# Patient Record
Sex: FEMALE | Race: WHITE | NOT HISPANIC OR LATINO | Employment: FULL TIME | URBAN - METROPOLITAN AREA
[De-identification: names, ages, dates, MRNs, and addresses within clinical notes are randomized per-mention and may not be internally consistent; named-entity substitution may affect disease eponyms.]

---

## 2020-01-21 ENCOUNTER — TELEPHONE (OUTPATIENT)
Dept: OTHER | Facility: OUTPATIENT CENTER | Age: 40
End: 2020-01-21

## 2020-01-21 NOTE — TELEPHONE ENCOUNTER
[facility] Telephone Intake    Date:  2020  Client Name:  Jose Francois  Preferred Name: Candida    MRN:  [id number]   :  1980       Age:  39 year old     Presenting Problem / Reason for Assessment   (Clinical History &Symptoms):     Pt Candida has struggled with gender identity for last 20 years. Summer 2019 made decision to move forward with a transition. Prefers Candida, uses female pronouns. Not out at work. Uses male name and pronouns in public. Wanting to discuss ways to move forward, would like to discuss HRT. Pt is open to seeing therapist, has a contact with w sliding scale. Candida will be a self-pay patient.     Suggested Program: TG    Follow Up:    Insurance Benefits to be evaluated.  Note will be entered when validated.    Patient wishes to be contacted regarding Insurance benefits:  Yes- Please call to verify Self-pay Process.    Please Verify Registration    Please send Welcome Packet and document date sent.

## 2020-01-28 NOTE — TELEPHONE ENCOUNTER
.Per: 0 w/ 0  Copay$ 0   Ded$ 0; Met$ 0   Coins 0%    Out of Pocket Max$ 0 ; Met$ 0     Psych testing require auth ([id number])? not applicable  Extended therapy require auth ([id number])?  not applicable    Exclusions:   Family Therapy (30624/62010)  NOT APPLICABLE    Marriage couple counseling  YES   Transgender/Gender Dysphoria not applicable   CSB not applicable   Sexual dysfunction not applicable    Patient Contacted about benefits:  SPOKE TO PATIENT RE:SELF-PAY - PLEASE MAKE SURE PATIENT/PROVIDER GET FEE TICKETS FOR PATIENT TO PAY FOR SERVICES SAME DAY WITH 16% DISCOUNT.  Date contacted: 1/28/2020

## 2020-01-30 ENCOUNTER — OFFICE VISIT (OUTPATIENT)
Dept: OTHER | Facility: OUTPATIENT CENTER | Age: 40
End: 2020-01-30

## 2020-01-30 DIAGNOSIS — F64.0 GENDER DYSPHORIA IN ADOLESCENT AND ADULT: ICD-10-CM
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
## 2020-02-11 ENCOUNTER — OFFICE VISIT (OUTPATIENT)
Dept: OTHER | Facility: OUTPATIENT CENTER | Age: 40
End: 2020-02-11

## 2020-02-11 VITALS — SYSTOLIC BLOOD PRESSURE: 137 MMHG | WEIGHT: 211.2 LBS | HEART RATE: 67 BPM | DIASTOLIC BLOOD PRESSURE: 98 MMHG

## 2020-02-11 DIAGNOSIS — F64.0 GENDER DYSPHORIA IN ADOLESCENT AND ADULT: Primary | ICD-10-CM

## 2020-02-13 DIAGNOSIS — Z01.89 LABORATORY TEST: Primary | ICD-10-CM

## 2020-02-13 DIAGNOSIS — F64.0 GENDER DYSPHORIA IN ADOLESCENT AND ADULT: ICD-10-CM

## 2020-02-13 LAB
ALBUMIN SERPL-MCNC: 4.5 G/DL (ref 3.4–5)
ALP SERPL-CCNC: 91 U/L (ref 40–150)
ALT SERPL W P-5'-P-CCNC: 33 U/L (ref 0–70)
ANION GAP SERPL CALCULATED.3IONS-SCNC: <1 MMOL/L (ref 3–14)
AST SERPL W P-5'-P-CCNC: 20 U/L (ref 0–45)
BILIRUB SERPL-MCNC: 0.6 MG/DL (ref 0.2–1.3)
BUN SERPL-MCNC: 18 MG/DL (ref 7–30)
CALCIUM SERPL-MCNC: 9.3 MG/DL (ref 8.5–10.1)
CHLORIDE SERPL-SCNC: 106 MMOL/L (ref 94–109)
CHOLEST SERPL-MCNC: 167 MG/DL
CO2 SERPL-SCNC: 33 MMOL/L (ref 20–32)
CREAT SERPL-MCNC: 0.95 MG/DL (ref 0.66–1.25)
GFR SERPL CREATININE-BSD FRML MDRD: >90 ML/MIN/{1.73_M2}
GLUCOSE SERPL-MCNC: 88 MG/DL (ref 70–99)
HDLC SERPL-MCNC: 64 MG/DL
LDLC SERPL CALC-MCNC: 90 MG/DL
NONHDLC SERPL-MCNC: 103 MG/DL
POTASSIUM SERPL-SCNC: 4.2 MMOL/L (ref 3.4–5.3)
PROT SERPL-MCNC: 7.8 G/DL (ref 6.8–8.8)
SODIUM SERPL-SCNC: 139 MMOL/L (ref 133–144)
TRIGL SERPL-MCNC: 66 MG/DL

## 2020-02-13 PROCEDURE — 84270 ASSAY OF SEX HORMONE GLOBUL: CPT | Performed by: FAMILY MEDICINE

## 2020-02-13 PROCEDURE — 80053 COMPREHEN METABOLIC PANEL: CPT | Performed by: FAMILY MEDICINE

## 2020-02-13 PROCEDURE — 84403 ASSAY OF TOTAL TESTOSTERONE: CPT | Performed by: FAMILY MEDICINE

## 2020-02-13 PROCEDURE — 80061 LIPID PANEL: CPT | Performed by: FAMILY MEDICINE

## 2020-02-13 NOTE — PROGRESS NOTES
Performed venipuncture for Dr. Beck.  Specimen get send to .    Sue Burdick CMA,SANTOS  February 13, 2020 8:54 AM

## 2020-02-14 PROBLEM — F64.0 GENDER DYSPHORIA IN ADOLESCENT AND ADULT: Status: ACTIVE | Noted: 2020-02-14

## 2020-02-15 LAB
SHBG SERPL-SCNC: 26 NMOL/L (ref 11–80)
TESTOST FREE SERPL-MCNC: 9.01 NG/DL (ref 4.7–24.4)
TESTOST SERPL-MCNC: 390 NG/DL (ref 240–950)

## 2020-02-24 NOTE — PROGRESS NOTES
This is a transgender medical evaluation.      IDENTIFICATION:  A 39-year-old trans woman.      CHIEF CONCERN:  Hormone therapy.      HISTORY OF PRESENT ILLNESS:  Patient has a longstanding history of gender dysphoria.  The diagnostic assessment was performed on 01/30/2020.  Please see this document for history of gender dysphoria.  The patient's current medical problems include eczema and a 3-day history of a pruritic rash on the back, more painful than pruritic actually.  Back was waxed 2 weeks ago.  Also coincided when started drinking red wine daily.      MEDICATIONS:  Zyrtec.      PAST MEDICAL HISTORY:  No hospitalizations or surgeries.      FAMILY HISTORY:  Unknown due to patient being adopted.      SOCIAL HISTORY:  Patient eats a standard diet but no dairy.  Does take an occasional vitamin D 0662-2893 units daily.      Active 3 times a week with strength training and 3-4 times a week cardio.  He is engaged in an active weight loss plan with a goal weight of 170 and has already lost about 35 pounds.  Quit smoking about 3-4 years ago, was a 1/2 pack per day prior smoker.  Alcohol use:  Once every 2 months, 1-2 drinks per sitting.  No recreational substances.      SEXUAL HISTORY:  The patient is not currently sexually active with a partner.  Has had approximately 7 partners in the lifetime.  Identifies as bisexual.  No history of STIs.  Last tested HIV negative in 04/2019.  Is vaccinated against hepatitis B.      REVIEW OF SYSTEMS:  Notable for the rest described above and bilateral wrist pain.  Remainder of 12-point review of systems is negative.      PHYSICAL EXAMINATION:   GENERAL:  The patient is alert, in no apparent distress.   VITAL SIGNS:  As noted above.  Height 5 feet 11 inches.     EXAM:  Constitutional: healthy, alert and no distress   Cardiovascular: negative, PMI normal. No lifts, heaves, or thrills. RRR. No murmurs, clicks gallops or rub  Respiratory: negative, Percussion normal. Good  diaphragmatic excursion. Lungs clear  Psychiatric: mentation appears normal and affect normal/bright  Neck: Neck supple. No adenopathy. Thyroid symmetric, normal size,, Carotids without bruits.  ENT: ENT exam normal, no neck nodes or sinus tenderness  Abdomen: Abdomen soft, non-tender. BS normal. No masses, organomegaly  : Deferred  NEURO: Gait normal. Reflexes normal and symmetric. Sensation grossly WNL., negative findings: cranial nerves 2-12 intact, muscle strength normal, reflexes normal and symmetric  SKIN: no suspicious lesions; entire back with papularpustular erythematous rash surrounding hair follicles  LYMPH: no cervical adenopathy  JOINT/EXTREMITIES: extremities normal- no gross deformities noted, gait normal and normal muscle tone    A/P  1. Gender dysphoria  The feminizing effects of hormone therapy were discussed at length, along the variability of outcomes and general timeframe for expected feminizing changes. Permanent vs semi-reversible changes were reviewed.   Patient was counseled regarding the potential risks and side effects of feminizing therapy including:  - reduced fertility, reproductive options, need for ongoing contraception (if indicated),  -changes to sexual function including reduced erections, libido and ejaculation  -potential for weight gain, changes to trigylcerides, and other indirect metabolic effects  -increased risk of venous thromboembolic events, gallstones, liver function tests  --mood changes, long term cardiovascular risks, potential cancer risks including breast cancer    I discussed the possible risk of temporary or irreversible impairment of the fertility with the patient today. She demonstrated a complete understanding of the fertility preservation options and declined fertility preservation.    Labs: CMP, lipids, testosterone    2. Folliculitis  Recommend no hair removal/depilation until resolved  Hibiclens x 1, antibacterial soap  Don't scrub or scratch. See PCP if  not resolving or worsening.    Follow-up when ready for hormone start

## 2020-02-25 NOTE — RESULT ENCOUNTER NOTE
Dear Candida REBOLLAR,   Here are your recent results which are within the expected range. Please continue with your current plan of care.       Los Beck MD

## 2020-03-11 ENCOUNTER — HEALTH MAINTENANCE LETTER (OUTPATIENT)
Age: 40
End: 2020-03-11

## 2020-03-19 ASSESSMENT — ANXIETY QUESTIONNAIRES
5. BEING SO RESTLESS THAT IT IS HARD TO SIT STILL: NOT AT ALL
GAD7 TOTAL SCORE: 5
2. NOT BEING ABLE TO STOP OR CONTROL WORRYING: SEVERAL DAYS
7. FEELING AFRAID AS IF SOMETHING AWFUL MIGHT HAPPEN: SEVERAL DAYS
3. WORRYING TOO MUCH ABOUT DIFFERENT THINGS: SEVERAL DAYS
1. FEELING NERVOUS, ANXIOUS, OR ON EDGE: SEVERAL DAYS
6. BECOMING EASILY ANNOYED OR IRRITABLE: NOT AT ALL

## 2020-03-19 ASSESSMENT — PATIENT HEALTH QUESTIONNAIRE - PHQ9
5. POOR APPETITE OR OVEREATING: SEVERAL DAYS
SUM OF ALL RESPONSES TO PHQ QUESTIONS 1-9: 2

## 2020-03-20 ASSESSMENT — ANXIETY QUESTIONNAIRES: GAD7 TOTAL SCORE: 5

## 2020-04-07 ENCOUNTER — MYC MEDICAL ADVICE (OUTPATIENT)
Dept: OTHER | Facility: OUTPATIENT CENTER | Age: 40
End: 2020-04-07

## 2020-04-11 ENCOUNTER — MYC MEDICAL ADVICE (OUTPATIENT)
Dept: OTHER | Facility: OUTPATIENT CENTER | Age: 40
End: 2020-04-11

## 2020-04-14 ENCOUNTER — VIRTUAL VISIT (OUTPATIENT)
Dept: OTHER | Facility: OUTPATIENT CENTER | Age: 40
End: 2020-04-14

## 2020-04-14 DIAGNOSIS — F64.0 GENDER DYSPHORIA IN ADOLESCENT AND ADULT: Primary | ICD-10-CM

## 2020-04-14 RX ORDER — ESTRADIOL 0.1 MG/D
1 FILM, EXTENDED RELEASE TRANSDERMAL
Qty: 8 PATCH | Refills: 2 | Status: SHIPPED | OUTPATIENT
Start: 2020-04-16 | End: 2020-05-12

## 2020-04-14 ASSESSMENT — ENCOUNTER SYMPTOMS
LIGHT-HEADEDNESS: 0
DYSPHORIC MOOD: 0
WEAKNESS: 0
NUMBNESS: 0
CHILLS: 0
FREQUENCY: 0
UNEXPECTED WEIGHT CHANGE: 0
PALPITATIONS: 0
VOMITING: 0
CHEST TIGHTNESS: 0
SHORTNESS OF BREATH: 0
ABDOMINAL PAIN: 0
FEVER: 0
NERVOUS/ANXIOUS: 0
HEADACHES: 0
POLYDIPSIA: 0

## 2020-04-14 NOTE — PROGRESS NOTES
"The following was reviewed with the patient.     \"This telephone visit will be conducted via a call between you and your physician/provider. We have found that certain health care needs can be provided without the need for a physical exam.  This service lets us provide the care you need with a short phone conversation.  If a prescription is necessary we can send it directly to your pharmacy.  If lab work is needed we can place an order for that and you can then stop by our lab to have the test done at a later time.    If during the course of the call the physician/provider feels a telephone visit is not appropriate, you will not be charged for this service.\"     Patient has given verbal consent for Telephone visit?  Yes         DELIA Tirado is a 39 year old individual that uses pronouns She/Her/Hers/Herself that presents today for follow up of:  feminizing hormone therapy.   Alone or accompanied by: accompanied today by self  Gender identity: female  Started Hormone  therapy  4/14/2020  Continues on Other n/a*.   Any special concerns today?    No new concerns or questions, ready to start hormone therapy    On hormones?  No  Gender related body changes since last visit: n/a    Breakthrough bleeding? Does Not Apply    New health concerns since last visit:  none  ---    No past surgical history on file.    Patient Active Problem List   Diagnosis     Gender dysphoria in adolescent and adult       No current outpatient medications on file.       History   Smoking Status     Not on file   Smokeless Tobacco     Not on file        No Known Allergies    Health Maintenance Due   Topic Date Due     MENTAL HEALTH TX PLAN  1980         Problem, Medication and Allergy Lists were reviewed and are current..         Review of Systems:   Review of Systems   Constitutional: Negative for chills, fever and unexpected weight change.   Eyes: Negative for visual disturbance.   Respiratory: Negative for chest tightness and shortness " of breath.    Cardiovascular: Negative for chest pain, palpitations and leg swelling.   Gastrointestinal: Negative for abdominal pain and vomiting.   Endocrine: Negative for polydipsia and polyuria.   Genitourinary: Negative for frequency.   Neurological: Negative for weakness, light-headedness, numbness and headaches.   Psychiatric/Behavioral: Negative for dysphoric mood and suicidal ideas. The patient is not nervous/anxious.               Labs:   Results from last visit:  Orders Only on 02/13/2020   Component Date Value Ref Range Status     Sodium 02/13/2020 139  133 - 144 mmol/L Final     Potassium 02/13/2020 4.2  3.4 - 5.3 mmol/L Final     Chloride 02/13/2020 106  94 - 109 mmol/L Final     Carbon Dioxide 02/13/2020 33* 20 - 32 mmol/L Final     Anion Gap 02/13/2020 <1* 3 - 14 mmol/L Final     Glucose 02/13/2020 88  70 - 99 mg/dL Final     Urea Nitrogen 02/13/2020 18  7 - 30 mg/dL Final     Creatinine 02/13/2020 0.95  0.66 - 1.25 mg/dL Final     GFR Estimate 02/13/2020 >90  >60 mL/min/[1.73_m2] Final    Comment: Non  GFR Calc  Starting 12/18/2018, serum creatinine based estimated GFR (eGFR) will be   calculated using the Chronic Kidney Disease Epidemiology Collaboration   (CKD-EPI) equation.       GFR Estimate If Black 02/13/2020 >90  >60 mL/min/[1.73_m2] Final    Comment:  GFR Calc  Starting 12/18/2018, serum creatinine based estimated GFR (eGFR) will be   calculated using the Chronic Kidney Disease Epidemiology Collaboration   (CKD-EPI) equation.       Calcium 02/13/2020 9.3  8.5 - 10.1 mg/dL Final     Bilirubin Total 02/13/2020 0.6  0.2 - 1.3 mg/dL Final     Albumin 02/13/2020 4.5  3.4 - 5.0 g/dL Final     Protein Total 02/13/2020 7.8  6.8 - 8.8 g/dL Final     Alkaline Phosphatase 02/13/2020 91  40 - 150 U/L Final     ALT 02/13/2020 33  0 - 70 U/L Final     AST 02/13/2020 20  0 - 45 U/L Final     Cholesterol 02/13/2020 167  <200 mg/dL Final     Triglycerides 02/13/2020 66  <150  mg/dL Final     HDL Cholesterol 02/13/2020 64  >39 mg/dL Final     LDL Cholesterol Calculated 02/13/2020 90  <100 mg/dL Final    Desirable:       <100 mg/dl     Non HDL Cholesterol 02/13/2020 103  <130 mg/dL Final     Testosterone Total 02/13/2020 390  240 - 950 ng/dL Final    Comment: This test was developed and its performance characteristics determined by the   [facility].   It has not been cleared or approved by the FDA. The laboratory is regulated   under CLIA as qualified to perform high-complexity testing. This test is used   for clinical purposes. It should not be regarded as investigational or for   research.       Sex Hormone Binding Globulin 02/13/2020 26  11 - 80 nmol/L Final     Free Testosterone Calculated 02/13/2020 9.01  4.7 - 24.4 ng/dL Final       Assessment and Plan   1. Gender dysphoria  Reviewed labs with patient  Discussed protocol for hormone therapy under current pandemic   Start Vivelle dot 0.1 mg 2x/wk,  instructed in use    Anticipate spironolactone start next visit, pt will buy bp monitor to assist with managing monitoring       Follow up:  Follow up in 1 month.    Phone call duration: 22 minutes        Results by Genmabgreyt  Questions were elicited and answered.     Los Beck MD

## 2020-04-21 ENCOUNTER — MYC MEDICAL ADVICE (OUTPATIENT)
Dept: OTHER | Facility: OUTPATIENT CENTER | Age: 40
End: 2020-04-21

## 2020-05-11 ENCOUNTER — TRANSFERRED RECORDS (OUTPATIENT)
Dept: HEALTH INFORMATION MANAGEMENT | Facility: CLINIC | Age: 40
End: 2020-05-11

## 2020-05-11 ENCOUNTER — MYC MEDICAL ADVICE (OUTPATIENT)
Dept: OTHER | Facility: OUTPATIENT CENTER | Age: 40
End: 2020-05-11

## 2020-05-11 RX ORDER — CETIRIZINE HYDROCHLORIDE 10 MG/1
10 TABLET ORAL DAILY
COMMUNITY

## 2020-05-11 RX ORDER — MULTIPLE VITAMINS W/ MINERALS TAB 9MG-400MCG
1 TAB ORAL DAILY
COMMUNITY

## 2020-05-12 ENCOUNTER — VIRTUAL VISIT (OUTPATIENT)
Dept: OTHER | Facility: OUTPATIENT CENTER | Age: 40
End: 2020-05-12

## 2020-05-12 ENCOUNTER — MYC MEDICAL ADVICE (OUTPATIENT)
Dept: OTHER | Facility: OUTPATIENT CENTER | Age: 40
End: 2020-05-12

## 2020-05-12 DIAGNOSIS — F64.0 GENDER DYSPHORIA IN ADOLESCENT AND ADULT: Primary | ICD-10-CM

## 2020-05-12 RX ORDER — SPIRONOLACTONE 100 MG/1
100 TABLET, FILM COATED ORAL DAILY
Qty: 30 TABLET | Refills: 2 | Status: SHIPPED | OUTPATIENT
Start: 2020-05-12 | End: 2020-08-04

## 2020-05-12 RX ORDER — ESTRADIOL 0.1 MG/D
1 FILM, EXTENDED RELEASE TRANSDERMAL
Qty: 8 PATCH | Refills: 2 | Status: SHIPPED | OUTPATIENT
Start: 2020-05-14 | End: 2020-08-04

## 2020-05-12 ASSESSMENT — ENCOUNTER SYMPTOMS
NERVOUS/ANXIOUS: 0
ABDOMINAL PAIN: 0
SHORTNESS OF BREATH: 0
PALPITATIONS: 0
LIGHT-HEADEDNESS: 0
CHILLS: 0
FREQUENCY: 0
POLYDIPSIA: 0
NUMBNESS: 0
CHEST TIGHTNESS: 0
DYSPHORIC MOOD: 0
HEADACHES: 0
WEAKNESS: 0
UNEXPECTED WEIGHT CHANGE: 0
VOMITING: 0
FEVER: 0

## 2020-05-12 NOTE — Clinical Note
1. Please send patient information for setting up lab appt and address for [facility]. Also looking for primary care provider  2. Would like to see TG therapist here,  please help set that up  Pt. Has Nurys

## 2020-05-12 NOTE — PROGRESS NOTES
"The patient has been notified of following:     \"This video visit will be conducted via a call between you and your physician/provider. We have found that certain health care needs can be provided without the need for an in-person physical exam.  This service lets us provide the care you need with a video conversation.  If a prescription is necessary we can send it directly to your pharmacy.  If lab work is needed we can place an order for that and you can then stop by our lab to have the test done at a later time.    If during the course of the call the physician/provider feels a video visit is not appropriate, you will not be charged for this service.\"     Patient has given verbal consent for Video visit? Yes    Patient would like the video invitation sent by: Send to e-mail at: [email or web address]    Video Start Time: 8:00 AM              DELIA Tirado is a 40 year old individual that uses pronouns She/Her/Hers/Herself that presents today for follow up of:  feminizing hormone therapy.   Alone or accompanied by: accompanied today by self  Gender identity: transfeminine  Started Hormone  therapy  *4/14/2020  Continues on Vivelle dot 0.1 mg patch 2x/wk  Any special concerns today?    Feeling a lot more level since starting hormones  Using tegaderm over patch, no concerns with hormone therapy    On hormones?  YES +++ Shot day of the week? Not applicable-taking pills/patch/gel      Due for labs?  Yes      +++ Refills of meds needed?  Yes  Gender related body changes since last visit:   Increase in appetite  Mild breast tenderness    Breakthrough bleeding? Does Not Apply    New health concerns since last visit:  None  ---    No past surgical history on file.    Patient Active Problem List   Diagnosis     Gender dysphoria in adolescent and adult       Current Outpatient Medications   Medication Sig Dispense Refill     cetirizine (ZYRTEC) 10 MG tablet Take 10 mg by mouth daily       estradiol (VIVELLE-DOT) 0.1 " MG/24HR bi-weekly patch Place 1 patch onto the skin twice a week 8 patch 2     multivitamin w/minerals (MULTI-VITAMIN) tablet Take 1 tablet by mouth daily         History   Smoking Status     Not on file   Smokeless Tobacco     Not on file        No Known Allergies    Health Maintenance Due   Topic Date Due     MENTAL HEALTH TX PLAN  1980         Problem, Medication and Allergy Lists were reviewed and are current..         Review of Systems:   Review of Systems   Constitutional: Negative for chills, fever and unexpected weight change.   Eyes: Negative for visual disturbance.   Respiratory: Negative for chest tightness and shortness of breath.    Cardiovascular: Negative for chest pain, palpitations and leg swelling.   Gastrointestinal: Negative for abdominal pain and vomiting.   Endocrine: Negative for polydipsia and polyuria.   Genitourinary: Negative for frequency.   Neurological: Negative for weakness, light-headedness, numbness and headaches.   Psychiatric/Behavioral: Negative for dysphoric mood and suicidal ideas. The patient is not nervous/anxious.               Labs:   Results from last visit:  Orders Only on 02/13/2020   Component Date Value Ref Range Status     Sodium 02/13/2020 139  133 - 144 mmol/L Final     Potassium 02/13/2020 4.2  3.4 - 5.3 mmol/L Final     Chloride 02/13/2020 106  94 - 109 mmol/L Final     Carbon Dioxide 02/13/2020 33* 20 - 32 mmol/L Final     Anion Gap 02/13/2020 <1* 3 - 14 mmol/L Final     Glucose 02/13/2020 88  70 - 99 mg/dL Final     Urea Nitrogen 02/13/2020 18  7 - 30 mg/dL Final     Creatinine 02/13/2020 0.95  0.66 - 1.25 mg/dL Final     GFR Estimate 02/13/2020 >90  >60 mL/min/[1.73_m2] Final    Comment: Non  GFR Calc  Starting 12/18/2018, serum creatinine based estimated GFR (eGFR) will be   calculated using the Chronic Kidney Disease Epidemiology Collaboration   (CKD-EPI) equation.       GFR Estimate If Black 02/13/2020 >90  >60 mL/min/[1.73_m2] Final     Comment:  GFR Calc  Starting 12/18/2018, serum creatinine based estimated GFR (eGFR) will be   calculated using the Chronic Kidney Disease Epidemiology Collaboration   (CKD-EPI) equation.       Calcium 02/13/2020 9.3  8.5 - 10.1 mg/dL Final     Bilirubin Total 02/13/2020 0.6  0.2 - 1.3 mg/dL Final     Albumin 02/13/2020 4.5  3.4 - 5.0 g/dL Final     Protein Total 02/13/2020 7.8  6.8 - 8.8 g/dL Final     Alkaline Phosphatase 02/13/2020 91  40 - 150 U/L Final     ALT 02/13/2020 33  0 - 70 U/L Final     AST 02/13/2020 20  0 - 45 U/L Final     Cholesterol 02/13/2020 167  <200 mg/dL Final     Triglycerides 02/13/2020 66  <150 mg/dL Final     HDL Cholesterol 02/13/2020 64  >39 mg/dL Final     LDL Cholesterol Calculated 02/13/2020 90  <100 mg/dL Final    Desirable:       <100 mg/dl     Non HDL Cholesterol 02/13/2020 103  <130 mg/dL Final     Testosterone Total 02/13/2020 390  240 - 950 ng/dL Final    Comment: This test was developed and its performance characteristics determined by the   [facility].   It has not been cleared or approved by the FDA. The laboratory is regulated   under CLIA as qualified to perform high-complexity testing. This test is used   for clinical purposes. It should not be regarded as investigational or for   research.       Sex Hormone Binding Globulin 02/13/2020 26  11 - 80 nmol/L Final     Free Testosterone Calculated 02/13/2020 9.01  4.7 - 24.4 ng/dL Final         EXAM:  Constitutional: healthy, alert and no distress   Psychiatric: mentation appears normal and affect normal/bright   Pt. Bought [employer], sends data to phone alva, pt send in data via [place], reviewed: ave. 30 data pt. 1110's-120/60-70's, weight around just around 200  Eating healthy diet    Assessment and Plan   1. Gender dysphoria  Tolerating estradiol well  BP's in mid normal range  Start 100 mg spironolactone daily  Labs: BMP, testosterone in  approximately 1 month  Pt. Unable to reach other recommended therapists--suggest may able to establish therapy here at [facility], to contact .  Recommend [place] for lab tests and to establish with PCP  To check bp at home intermittently, contact me if significant lightheadedness      Follow up:  Follow up in 3 months.        Video-Visit Details    Type of service:  Video Visit    Video End Time (time video stod): *8:16    Originating Location (pt. Location): Home    Distant Location (provider location):  [place] [facility]     Mode of Communication:  Video Conference via Doxy    Los Beck MD        Results by [facility]  Questions were elicited and answered.     Los Beck MD

## 2020-06-01 ENCOUNTER — MYC MEDICAL ADVICE (OUTPATIENT)
Dept: OTHER | Facility: OUTPATIENT CENTER | Age: 40
End: 2020-06-01

## 2020-06-07 ENCOUNTER — MYC REFILL (OUTPATIENT)
Dept: OTHER | Facility: OUTPATIENT CENTER | Age: 40
End: 2020-06-07

## 2020-06-07 DIAGNOSIS — F64.0 GENDER DYSPHORIA IN ADOLESCENT AND ADULT: ICD-10-CM

## 2020-06-07 RX ORDER — SPIRONOLACTONE 100 MG/1
100 TABLET, FILM COATED ORAL DAILY
Qty: 30 TABLET | Refills: 2 | Status: CANCELLED | OUTPATIENT
Start: 2020-06-07

## 2020-06-07 RX ORDER — ESTRADIOL 0.1 MG/D
1 FILM, EXTENDED RELEASE TRANSDERMAL
Qty: 8 PATCH | Refills: 2 | Status: CANCELLED | OUTPATIENT
Start: 2020-06-08

## 2020-06-08 NOTE — TELEPHONE ENCOUNTER
New Rx was sent 5/2020 for both their Estradiol and Spironolactone with 2 refills.      Valentine Culver,CMA

## 2020-06-09 ENCOUNTER — MYC MEDICAL ADVICE (OUTPATIENT)
Dept: OTHER | Facility: OUTPATIENT CENTER | Age: 40
End: 2020-06-09

## 2020-06-09 ENCOUNTER — OFFICE VISIT (OUTPATIENT)
Dept: FAMILY MEDICINE | Facility: CLINIC | Age: 40
End: 2020-06-09
Payer: COMMERCIAL

## 2020-06-09 ENCOUNTER — ALLIED HEALTH/NURSE VISIT (OUTPATIENT)
Dept: NURSING | Facility: CLINIC | Age: 40
End: 2020-06-09
Payer: COMMERCIAL

## 2020-06-09 VITALS
TEMPERATURE: 97.5 F | SYSTOLIC BLOOD PRESSURE: 120 MMHG | HEART RATE: 73 BPM | DIASTOLIC BLOOD PRESSURE: 70 MMHG | OXYGEN SATURATION: 98 %

## 2020-06-09 DIAGNOSIS — L55.9 SUNBURN: Primary | ICD-10-CM

## 2020-06-09 DIAGNOSIS — L98.9 SKIN ABNORMALITIES: Primary | ICD-10-CM

## 2020-06-09 PROCEDURE — 99207 ZZC NO CHARGE NURSE ONLY: CPT

## 2020-06-09 PROCEDURE — 99203 OFFICE O/P NEW LOW 30 MIN: CPT | Performed by: FAMILY MEDICINE

## 2020-06-09 RX ORDER — HYDROCODONE BITARTRATE AND ACETAMINOPHEN 5; 325 MG/1; MG/1
1 TABLET ORAL EVERY 6 HOURS PRN
Qty: 6 TABLET | Refills: 0 | Status: SHIPPED | OUTPATIENT
Start: 2020-06-09 | End: 2020-08-10

## 2020-06-09 NOTE — PROGRESS NOTES
Patient added onto Dr. Brooke schedule for evaluation please see the 6/9/2020 OV for further documentation.     Patient presents to clinic today due to significant reddening of bilateral lower legs.  He reports that he was on his deck Sunday (6/7/2020) chatting with a friend for about 4 hours.  When he was done he did not noticed any reddening of his lower legs.  He woke at about 1:00 am Monday morning with significant redness and on his bilateral lower legs.  He went to work and was unable to complete a full days work due to the pain.  He came home and iced them down (he did not put any barrier between the ice pack and skin). He did this for about 4 hours and did gets some relief.  The pain has been worsening and it is not relieved by ibuprofen or Tylenol 3. He used 3 bottle of aloe to try to help the pain.  The aloe would immediately soak in.  Looking at his legs there is significant reddening on his anterior lower legs that does not go all the way around. I do not see the same redness on his arms and only slight reddening of what I can see of his face (due to mask).  He denies nausea, vomiting, SOB, chest pain.  He is able to urinate every 8 hours and his reports his temp to be 99.5 before he came.  Report was given to Dr. Brooke, MA notified and asked to fully room the patient and he was added onto Dr. Brooke's schedule. Patient made aware of the plan.  Thank you. Jami Ferrer R.N.

## 2020-06-09 NOTE — PATIENT INSTRUCTIONS
1. Cool compresses, elevation, aloe, calamine for symptom relief.    2. Ibuprofen 600 to 800 mg 3 times per day with food for the next 3 days.    3. Norco as needed for night time - no driving within 4 hours of taking this. Do not mix with alcohol or other sedatives.    4. You should feel better in the next 2 days. Report worsening symptoms.

## 2020-06-09 NOTE — PROGRESS NOTES
HPI:    Jose Francois is an 40 year old adult who presents as a walk-in to discuss:    Please note phenotypically on his profile picture, he appears female but in clinic appears male.    Sunburn - this occurred on 6/7/20 while he was sitting out on his porch. Areas affected are his forehead, forearms but the area that hurts the most is the anterior thighs. He says the pain is 7/10 and interferes with sleep. The left ankle is swollen. No fevers. No blisters. No symptoms of compartment syndrome like foot tingling, palor etc.  Evaluation and treatment:    He has applied ice which helps. I suggested cool compress instead but he disagreed with this.   He has used Ibuprofen and applied aloe and lotion which help.   He works at [employer] where is on his feet a lot - but fortunately he is off 6/10 and 6/11/20.   I suggested elevating the leg but he disagreed with this, citing he has a lot of things to do.   He tried Tylenol with codeine (from previous rx) which did not help.   I asked him to take Ibuprofen for the next few days and suggested dosages.   Norco as needed for night time - warnings discussed.   His pain should resolve in the next few days.    ROS:    Per HPI    Exam:      /70   Pulse 73   Temp 97.5  F (36.4  C) (Tympanic)   SpO2 98%     Gen: Healthy appearing adult in no acute distress. He seems a bit irritated - reasons are not clear to me.  CV: DP pulses normal. Tips of the toes are warm with good capillary refill. There is trace left ankle edema.  Skin: blanching erythema without blisters on the forehead, forearms, and anterior thighs and legs well demarcated based on what he was wearing previously - shorts and ankle length socks.      Assessment and Plan - Decision Making    1. Sunburn    Per HPI    - HYDROcodone-acetaminophen (NORCO) 5-325 MG tablet; Take 1 tablet by mouth every 6 hours as needed for pain  Dispense: 6 tablet; Refill: 0      Written instructions given as follows:    Patient  Instructions   1. Cool compresses, elevation, aloe, calamine for symptom relief.    2. Ibuprofen 600 to 800 mg 3 times per day with food for the next 3 days.    3. Norco as needed for night time - no driving within 4 hours of taking this. Do not mix with alcohol or other sedatives.    4. You should feel better in the next 2 days. Report worsening symptoms.

## 2020-06-10 ENCOUNTER — VIRTUAL VISIT (OUTPATIENT)
Dept: OTHER | Facility: OUTPATIENT CENTER | Age: 40
End: 2020-06-10

## 2020-06-10 DIAGNOSIS — F64.0 GENDER DYSPHORIA IN ADOLESCENT AND ADULT: ICD-10-CM

## 2020-06-10 DIAGNOSIS — F64.0 GENDER DYSPHORIA IN ADOLESCENT AND ADULT: Primary | ICD-10-CM

## 2020-06-10 LAB
ANION GAP SERPL CALCULATED.3IONS-SCNC: 4 MMOL/L (ref 3–14)
BUN SERPL-MCNC: 16 MG/DL (ref 7–30)
CALCIUM SERPL-MCNC: 8.9 MG/DL (ref 8.5–10.1)
CHLORIDE SERPL-SCNC: 103 MMOL/L (ref 94–109)
CO2 SERPL-SCNC: 30 MMOL/L (ref 20–32)
CREAT SERPL-MCNC: 1.02 MG/DL (ref 0.66–1.25)
GFR SERPL CREATININE-BSD FRML MDRD: >90 ML/MIN/{1.73_M2}
GLUCOSE SERPL-MCNC: 87 MG/DL (ref 70–99)
POTASSIUM SERPL-SCNC: 4.1 MMOL/L (ref 3.4–5.3)
SODIUM SERPL-SCNC: 138 MMOL/L (ref 133–144)
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
## 2020-06-11 ENCOUNTER — MYC MEDICAL ADVICE (OUTPATIENT)
Dept: FAMILY MEDICINE | Facility: CLINIC | Age: 40
End: 2020-06-11

## 2020-06-11 LAB
SHBG SERPL-SCNC: 33 NMOL/L (ref 11–80)
TESTOST FREE SERPL-MCNC: 0.44 NG/DL (ref 4.7–24.4)
TESTOST SERPL-MCNC: 25 NG/DL (ref 240–950)

## 2020-06-11 NOTE — TELEPHONE ENCOUNTER
Patient was seen 2 days ago for a sunburn and prescribed norco.  See patient message and attached pictures.     Routing to provider to advise.  Stefanie MICHAELN, RN

## 2020-06-22 NOTE — RESULT ENCOUNTER NOTE
Dear Candida,   Here are your recent results which are within the expected range. Testosterone full suppressed Please continue with your current plan of care.       Los Beck MD

## 2020-06-24 ENCOUNTER — VIRTUAL VISIT (OUTPATIENT)
Dept: OTHER | Facility: OUTPATIENT CENTER | Age: 40
End: 2020-06-24

## 2020-06-24 DIAGNOSIS — F64.0 GENDER DYSPHORIA IN ADOLESCENT AND ADULT: Primary | ICD-10-CM

## 2020-06-24 NOTE — PROGRESS NOTES
Telemedicine Visit: The client's condition can be safely assessed and treated via synchronous audio and visual telemedicine encounter.    Reason for Telemedicine Visit: COVID-19 restrictions.  Originating Site (Client Location): Client's home  Distant Site (Provider Location): Provider Remote Setting  Consent: The client/guardian has verbally consented to: the potential risks and benefits of telemedicine (video visit) versus in person care; bill my insurance or make self-payment for services provided; and responsibility for payment of non-covered services.   Mode of Communication: Video Conference via [employer]     As the provider I attest to compliance with applicable laws and regulations related to telemedicine.  Video start time: 8:00am  Video end time: 8:53am      [facility] Sexual Health -  Case Progress Note     Date of Service: 6/24/20  Client Name: Jose Francois (name used: Candida - pronouns: she/her/her)  YOB: 1980  Age: 40 year old  MRN: [id number]  Gender/Gender Identity: female  Treating Provider: Kenia Webb, PhD, Postdoctoral Fellow  Type of Session: Individual  Present in Session: Client only  Number of Minutes: 53 min     Current Symptoms/Status:     Gender Dysphoria: Gender and body dysphoria associated with fear and anxiety. Incongruence and distress between her biological sex and her experienced/expressed gender. Recurrent insecurities regarding her gender needs.      Progress Toward Treatment Goals:   Session # 1 - Client continues to show interest and commitment towards receiving services at [facility].  First session post diagnostic. Started working on treatment plan.     Intervention: Modality and Description:  Individual, interpersonal, CBT. The focus of today's session was exploring her gender needs.     Response to Intervention:  Client has been exploring her needs and the discussion identified aspects that brought insights and motivation to change her behavior and atitudes.  She often finds herself controlled by the fear of rejection and anxiety is also a negative factor in this scenario. She perceived control strategies over situations, although, she are not very effective. She noted some resistance to change these strategies. In general, she said that she has had more frequent feelings on being in the right track.     Assignment:  No assignments was address today.     Interactive Complexity:  Communication difficulties present during current the psychiatric procedure include:  1. None.     Diagnosis:  302.85 (F64.0) Gender Dysphoria in Adolescents and Adults     Plan / Need for Future Services:  Return for therapy in two (2) weeks.      Kenia Webb, PhD, Postdoctoral Fellow

## 2020-06-30 NOTE — PROGRESS NOTES
I did not personally see the patient.  I reviewed and agree with the assessment and plan as documented in this note.     Misty De Anda PsyD, LP

## 2020-07-10 ENCOUNTER — VIRTUAL VISIT (OUTPATIENT)
Dept: OTHER | Facility: OUTPATIENT CENTER | Age: 40
End: 2020-07-10

## 2020-07-10 DIAGNOSIS — F64.0 GENDER DYSPHORIA IN ADOLESCENT AND ADULT: Primary | ICD-10-CM

## 2020-07-10 NOTE — PROGRESS NOTES
Telemedicine Visit: The client's condition can be safely assessed and treated via synchronous audio and visual telemedicine encounter.    Reason for Telemedicine Visit: COVID-19 restrictions.  Originating Site (Client Location): Client's home  Distant Site (Provider Location): Provider Remote Setting  Consent: The client/guardian has verbally consented to: the potential risks and benefits of telemedicine (video visit) versus in person care; bill my insurance or make self-payment for services provided; and responsibility for payment of non-covered services.   Mode of Communication: Video Conference via [employer]     As the provider I attest to compliance with applicable laws and regulations related to telemedicine.  Video start time: 8:00am  Video end time: 8:53am      [facility] Sexual Health -  Case Progress Note     Date of Service: 7/10/20  Client Name: Jose Francois (name used: Candida - pronouns: she/her/her)  YOB: 1980  Age: 40 year old  MRN: [id number]  Gender/Gender Identity: Female  Treating Provider: Kenia Webb, PhD, Postdoctoral Fellow  Type of Session: Individual  Present in Session: Client only  Number of Minutes: 53 min     Current Symptoms/Status:     Gender Dysphoria: Gender and body dysphoria associated with fear and anxiety. Incongruence and distress between her biological sex and her experienced/expressed gender. Recurrent insecurities regarding her gender needs.      Progress Toward Treatment Goals:   Session # 2 - Client continues to show interest and commitment towards receiving services at [facility].  First session post diagnostic. Started working on treatment plan.     Intervention: Modality and Description:   Individual, interpersonal, CBT. The focus of today's session was exploring her current fears.     Response to Intervention:  Client has been confronting her fears and she has been observing the reactions that she has had in this regard. It was discussed how she can observe  those situations in concrete, abstract and behavioral aspects. She brought examples of increased confidence, although she still finds it difficult to credit herself. The rejection, misgendered and inadequacy correspond to the main sources of her fear and anxieties.    Assignment:  No assignments was address today.     Interactive Complexity:  Communication difficulties present during current the psychiatric procedure include:  1. None.     Diagnosis:  302.85 (F64.0) Gender Dysphoria in Adolescents and Adults     Plan / Need for Future Services:  Return for therapy in two (2) weeks.      Kenia Webb, PhD, Postdoctoral Fellow

## 2020-07-24 ENCOUNTER — VIRTUAL VISIT (OUTPATIENT)
Dept: OTHER | Facility: OUTPATIENT CENTER | Age: 40
End: 2020-07-24

## 2020-07-24 DIAGNOSIS — F64.0 GENDER DYSPHORIA IN ADOLESCENT AND ADULT: Primary | ICD-10-CM

## 2020-07-24 NOTE — PROGRESS NOTES
Telemedicine Visit: The client's condition can be safely assessed and treated via synchronous audio and visual telemedicine encounter.    Reason for Telemedicine Visit: COVID-19 restrictions.  Originating Site (Client Location): Client's home  Distant Site (Provider Location): Provider Remote Setting  Consent: The client/guardian has verbally consented to: the potential risks and benefits of telemedicine (video visit) versus in person care; bill my insurance or make self-payment for services provided; and responsibility for payment of non-covered services.   Mode of Communication: Video Conference via [employer]     As the provider I attest to compliance with applicable laws and regulations related to telemedicine.  Video start time: 9:00am  Video end time: 9:53am      [facility] Sexual Health -  Case Progress Note     Date of Service: 7/24/20  Client Name: Jose Francois (name used: Candida - pronouns: she/her/her)  YOB: 1980  Age: 40 year old  MRN: [id number]  Gender/Gender Identity: Female  Treating Provider: Kenia Webb, PhD, Postdoctoral Fellow  Type of Session: Individual  Present in Session: Client only  Number of Minutes: 53 min     Current Symptoms/Status:     Gender Dysphoria: Gender and body dysphoria associated with fear and anxiety. Incongruence and distress between her biological sex and her experienced/expressed gender. Recurrent insecurities regarding her gender needs. Recurrent fear in affirming her gender at work.      Progress Toward Treatment Goals:   Session # 3 - Client continues to show interest and commitment towards receiving services at [facility].  The treatment plan has been finalized. See below.     Intervention: Modality and Description:   Individual, interpersonal, CBT. The focus of today's session was talking about her experience in came out at work.     Response to Intervention:  Client has shown a positive state and she has been relieved to be congruent with her gender  identity at work. She described good support from co-workers and she started to notice differences in treatment due to the fact that she presented herself as a woman. The increase in insecurity in relation to affirming her gender was discussed. Her attitudes and behaviors were reinforced and she will remain assertive and with positive coping strategies in future situations.    Assignment:  No assignments was address today.     Interactive Complexity:  Communication difficulties present during current the psychiatric procedure include:  1. None.     Diagnosis:  302.85 (F64.0) Gender Dysphoria in Adolescents and Adults     Plan / Need for Future Services:  Return for therapy in two (2) weeks.      Kenia Webb, PhD, Postdoctoral Fellow    TREATMENT PLAN    Date of Service: 20  Name: Jose Francois (name used: Candida - pronouns: she/her/her)  : 1980  Medical Record Number: [id number]  Treating Provider: Kenia Webb, PhD, Postdoctoral Fellow  Type of Session: Individual  Present in Session: Client only    Current Status:    Depression/Mood:  Sad  Increase or Decrease in Appetite/Weight  Increase or Decrease in Sleep  Decreased Self-Esteem  Dysphoria    Anxiety/Panic:  Worry  Social Fear  Fear Loss/Control    Thought:   Distractable    Sensorium:  Reports no problems or symptoms at this time    Behavior/Health:  Reports no problems or symptoms at this time    Chemical Use:  Denies both Alcohol and Drug Abuse    Sexual Problems:  Gender Dysphoria  Body Dysphoria  Social Dysphoria     Suicide Risk Assessment:   Assessed Level of Immediate Risk:  YES  Ideation:  NO  Plan:  NO  Means:  NO  Intent:  NO    Homicide Risk Assessment:  Assessed Level of Immediate Risk:  YES  Ideation:  NO  Plan:  NO  Means:  NO  Intent:  NO    Impact of Symptoms on Function:  Decreased Physical/Health Status  Decreased Social/Family Function  Decreased Work Function    DSM-5 Diagnoses:   302.85 (F64.0) Gender Dysphoria in  Adolescents and Adults   300.4 (F34.1) Persistent Depressive Disorder (Dysthymia) with anxious distress, mild (Rule Out)    Screening Questionnaires:   Please indicate whether the following screening questionnaires have been completed:  CAGE: Yes  PHQ-9: Yes    MERY-7: Yes  Safety Screening: Yes  WHODAS: No  Other: N/A    Problem(s):  1. Incongruence and distress between her biological sex and her experienced/expressed gender. She demonstrates female characteristics in mannerisms. She has feelings as woman. A strong desire to have female body characteristics. Recurrent and intermittent distress with her genital, most part of time is daily. Strong negative feelings when she needs to present herself as male.    Short-Long Term Goals  Decrease Symptoms  Increase Coping  Change Behavior  Increase Psychosocial/Support Functioning  Increase Decision Making  Improve Communication  Decrease Dysphoria    Interventions  Cognitive-Behavioral Therapy  Behavior Therapy  Family Therapy  Relaxation Training/Hypnosis  Bibliotherapy    Expected Outcomes and Prognosis  Expected improvement  Relieve acute symptoms    Anticipated Treatment Duration:  1 year    Frequency of Sessions  2 x / Month    Progress Update (for Plan Update Only)  NA:  1st Treatment Planning    Other Specific Goal Objectives for Treatment:  1. Developing a consistent, positive self-image. Establish an inward sense of self-worth, confidence, and competence.  2. Reducing overall frequency and intensity of the negative states associated with gender identity so that daily functioning is not impaired.  3. Empowering her gender identity and engage in a wide range of relationships that are supportive of that identity.    Current Psychoactive Medications:  Not Applicable    Discharge & Aftercare Goals: To Be Determined.    Care Coordination: Yes    Consent to Treatment: Client participated in this treatment planning process and indicated verbal agreement with the above  "treatment plan.    Patient Signature: Jose Priscila (name used: Candida - pronouns: she/her/her) - \"verbal consent given\"    Date: 7/24/20    Parent/Guardian Signature: N/A  Date: N/A    If patient doesn't sign, indicate why: N/A    Provider Signature: Kenia Webb   Date: 7/24/20    Co/Supervisor Signature: Misty De Anda  Date: 7/24/20    "

## 2020-08-04 DIAGNOSIS — F64.0 GENDER DYSPHORIA IN ADOLESCENT AND ADULT: ICD-10-CM

## 2020-08-04 RX ORDER — ESTRADIOL 0.1 MG/D
1 FILM, EXTENDED RELEASE TRANSDERMAL
Qty: 8 PATCH | Refills: 2 | Status: SHIPPED | OUTPATIENT
Start: 2020-08-06 | End: 2020-08-11

## 2020-08-04 RX ORDER — SPIRONOLACTONE 100 MG/1
100 TABLET, FILM COATED ORAL DAILY
Qty: 30 TABLET | Refills: 2 | Status: SHIPPED | OUTPATIENT
Start: 2020-08-04 | End: 2020-08-11

## 2020-08-04 NOTE — TELEPHONE ENCOUNTER
Requested Medication: Spironolactone  Dose: 100mg  Quantity: 30  Refills: 2    Take 1 (100mg) tablet daily  ________    Requested Medication: Estradiol  Dose: 0.1mg  Quantity: 8  Refills: 2    Apply 1 patch twice weekly    Last seen at [facility]: 5/12 - 3 mo follow up  Next Appointment with Provider: 8/11    Valentine Culver CMA

## 2020-08-07 ENCOUNTER — VIRTUAL VISIT (OUTPATIENT)
Dept: OTHER | Facility: OUTPATIENT CENTER | Age: 40
End: 2020-08-07

## 2020-08-07 DIAGNOSIS — F64.0 GENDER DYSPHORIA IN ADOLESCENT AND ADULT: Primary | ICD-10-CM

## 2020-08-07 NOTE — PROGRESS NOTES
Telemedicine Visit: The client's condition can be safely assessed and treated via synchronous audio and visual telemedicine encounter.    Reason for Telemedicine Visit: COVID-19 restrictions.  Originating Site (Client Location): Client's home  Distant Site (Provider Location): Provider Remote Setting  Consent: The client/guardian has verbally consented to: the potential risks and benefits of telemedicine (video visit) versus in person care; bill my insurance or make self-payment for services provided; and responsibility for payment of non-covered services.   Mode of Communication: Video Conference via [employer]     As the provider I attest to compliance with applicable laws and regulations related to telemedicine.  Video start time: 8:00am  Video end time: 8:53am      [facility] Sexual Health -  Case Progress Note     Date of Service: 8/07/20  Client Name: Jose Francois (name used: Candida - pronouns: she/her/her)  YOB: 1980  Age: 40 year old  MRN: [id number]  Gender/Gender Identity: Female  Treating Provider: Kenia Webb, PhD, Postdoctoral Fellow  Type of Session: Individual  Present in Session: Client only  Number of Minutes: 53 min     Current Symptoms/Status:     Gender Dysphoria: Gender and body dysphoria associated with fear and anxiety. Incongruence and distress between her biological sex and her experienced/expressed gender. Recurrent insecurities regarding her gender needs. Recurrent fear in affirming her gender at work.      Progress Toward Treatment Goals:   Session # 4 - Client continues to show interest and commitment towards receiving services at [facility].  Continued working on her assignments.    Intervention: Modality and Description:   Individual, interpersonal, CBT. The focus of today's session was talking about her experience in came out at work (continue).     Response to Intervention:  Client is still emotionally processing the changes the she is experiencing at work. She remains  persistent and happy to affirm her identity in this environment. She has been supported by management in how she can protect herself from any harassment situation. She still feels in transition regarding her emotions as she continues to experience fears and sensations connected to her old gender identity. She recently gifted herself, piercing her ears so she can wear earrings.    Assignment:  No assignments was address today.     Interactive Complexity:  Communication difficulties present during current the psychiatric procedure include:  1. None.     Diagnosis:  302.85 (F64.0) Gender Dysphoria in Adolescents and Adults     Plan / Need for Future Services:  Return for therapy in two (2) weeks.      Kenia Webb, PhD, Postdoctoral Fellow

## 2020-08-10 VITALS — DIASTOLIC BLOOD PRESSURE: 61 MMHG | HEART RATE: 52 BPM | SYSTOLIC BLOOD PRESSURE: 102 MMHG | WEIGHT: 183 LBS

## 2020-08-11 ENCOUNTER — VIRTUAL VISIT (OUTPATIENT)
Dept: OTHER | Facility: OUTPATIENT CENTER | Age: 40
End: 2020-08-11

## 2020-08-11 DIAGNOSIS — F64.0 GENDER DYSPHORIA IN ADOLESCENT AND ADULT: ICD-10-CM

## 2020-08-11 RX ORDER — ESTRADIOL 0.1 MG/D
2 FILM, EXTENDED RELEASE TRANSDERMAL
Qty: 16 PATCH | Refills: 3 | Status: SHIPPED | OUTPATIENT
Start: 2020-08-13 | End: 2020-12-07

## 2020-08-11 RX ORDER — SPIRONOLACTONE 100 MG/1
100 TABLET, FILM COATED ORAL DAILY
Qty: 30 TABLET | Refills: 3 | Status: SHIPPED | OUTPATIENT
Start: 2020-08-11 | End: 2020-12-07

## 2020-08-11 RX ORDER — FINASTERIDE 5 MG/1
5 TABLET, FILM COATED ORAL DAILY
Qty: 30 TABLET | Refills: 3 | Status: SHIPPED | OUTPATIENT
Start: 2020-08-11 | End: 2020-12-07

## 2020-08-11 ASSESSMENT — ENCOUNTER SYMPTOMS
CHEST TIGHTNESS: 0
VOMITING: 0
CHILLS: 0
POLYDIPSIA: 0
NUMBNESS: 0
DYSPHORIC MOOD: 0
UNEXPECTED WEIGHT CHANGE: 0
ABDOMINAL PAIN: 0
LIGHT-HEADEDNESS: 0
WEAKNESS: 0
PALPITATIONS: 0
HEADACHES: 0
FREQUENCY: 0
NERVOUS/ANXIOUS: 0
SHORTNESS OF BREATH: 0
FEVER: 0

## 2020-08-11 NOTE — PROGRESS NOTES
"The patient has been notified of following:     \"This video visit will be conducted via a call between you and your physician/provider. We have found that certain health care needs can be provided without the need for an in-person physical exam.  This service lets us provide the care you need with a video conversation.  If a prescription is necessary we can send it directly to your pharmacy.  If lab work is needed we can place an order for that and you can then stop by our lab to have the test done at a later time.    If during the course of the call the physician/provider feels a video visit is not appropriate, you will not be charged for this service.\"     Patient has given verbal consent for Video visit? Yes    Patient would like the video invitation sent by: Send to e-mail at: [email or web address]    Video Start Time: 8:05 AM              DELIA Tirado is a 40 year old individual that uses pronouns She/Her/Hers/Herself that presents today for follow up of:  feminizing hormone therapy.   Alone or accompanied by: accompanied today by self  Gender identity: female  Started Hormone  therapy  4/2020  Continues on Vivelle dot 0.1 mg patch 2x/wk spironolactone 100 mg daily  Any special concerns today?    Came out at work, full time  No problems with medications    On hormones?  YES +++ Shot day of the week? Not applicable-taking pills/patch/gel      Due for labs?  Yes      +++ Refills of meds needed?  Yes  Gender related body changes since last visit:   Breast growth  Little fat distribution yet, working a lot--lost 10-20 lbs since last visit    Some hair on head regrowth  Mood much more comformtable     Breakthrough bleeding? Does Not Apply    New health concerns since last visit:  ---none    No past surgical history on file.    Patient Active Problem List   Diagnosis     Gender dysphoria in adolescent and adult       Current Outpatient Medications   Medication Sig Dispense Refill     cetirizine " (ZYRTEC) 10 MG tablet Take 10 mg by mouth daily       estradiol (VIVELLE-DOT) 0.1 MG/24HR bi-weekly patch Place 1 patch onto the skin twice a week 8 patch 2     fish oil-omega-3 fatty acids 1000 MG capsule Take 2 g by mouth daily       multivitamin w/minerals (MULTI-VITAMIN) tablet Take 1 tablet by mouth daily       spironolactone (ALDACTONE) 100 MG tablet Take 1 tablet (100 mg) by mouth daily (Patient not taking: Reported on 8/10/2020) 30 tablet 2       History   Smoking Status     Never Smoker   Smokeless Tobacco     Never Used        No Known Allergies    Health Maintenance Due   Topic Date Due     MENTAL HEALTH TX PLAN  1980         Problem, Medication and Allergy Lists were reviewed and are current..         Review of Systems:   Review of Systems   Constitutional: Negative for chills, fever and unexpected weight change.   Eyes: Negative for visual disturbance.   Respiratory: Negative for chest tightness and shortness of breath.    Cardiovascular: Negative for chest pain, palpitations and leg swelling.   Gastrointestinal: Negative for abdominal pain and vomiting.   Endocrine: Negative for polydipsia and polyuria.   Genitourinary: Negative for frequency.   Neurological: Negative for weakness, light-headedness, numbness and headaches.   Psychiatric/Behavioral: Negative for dysphoric mood and suicidal ideas. The patient is not nervous/anxious.               Labs:   Results from last visit:  Orders Only on 06/10/2020   Component Date Value Ref Range Status     Testosterone Total 06/10/2020 25* 240 - 950 ng/dL Final    Comment: This test was developed and its performance characteristics determined by the   [facility].   It has not been cleared or approved by the FDA. The laboratory is regulated   under CLIA as qualified to perform high-complexity testing. This test is used   for clinical purposes. It should not be regarded as investigational or  for   research.       Sex Hormone Binding Globulin 06/10/2020 33  11 - 80 nmol/L Final     Free Testosterone Calculated 06/10/2020 0.44* 4.7 - 24.4 ng/dL Final     Sodium 06/10/2020 138  133 - 144 mmol/L Final     Potassium 06/10/2020 4.1  3.4 - 5.3 mmol/L Final     Chloride 06/10/2020 103  94 - 109 mmol/L Final     Carbon Dioxide 06/10/2020 30  20 - 32 mmol/L Final     Anion Gap 06/10/2020 4  3 - 14 mmol/L Final     Glucose 06/10/2020 87  70 - 99 mg/dL Final     Urea Nitrogen 06/10/2020 16  7 - 30 mg/dL Final     Creatinine 06/10/2020 1.02  0.66 - 1.25 mg/dL Final     GFR Estimate 06/10/2020 >90  >60 mL/min/[1.73_m2] Final    Comment: Non  GFR Calc  Starting 12/18/2018, serum creatinine based estimated GFR (eGFR) will be   calculated using the Chronic Kidney Disease Epidemiology Collaboration   (CKD-EPI) equation.       GFR Estimate If Black 06/10/2020 >90  >60 mL/min/[1.73_m2] Final    Comment:  GFR Calc  Starting 12/18/2018, serum creatinine based estimated GFR (eGFR) will be   calculated using the Chronic Kidney Disease Epidemiology Collaboration   (CKD-EPI) equation.       Calcium 06/10/2020 8.9  8.5 - 10.1 mg/dL Final          EXAM:  Purchased scale with multi function  Wt 184 lb  BP 96/52, usually average 100-110's/60-70 first in AM  Constitutional: healthy, alert and no distress   Respiratory: negative  Psychiatric: mentation appears normal and affect normal/bright     Assessment and Plan   1. Gender dysphoria  Clinically appropriate response to current hormone regimen  Testosterone suppressed on current dose. BP is fairly low on current spironolactone dose. Discussed treatment options at this point  Will increase estradiol to 0.1 mg x 2 patches at once 2x/wk  Add finasteride 5 mg daily, no change to spironolactone  Labs: estradiol, testosterone level in 2 months    Follow up:  Follow up in 3 months.        Video-Visit Details    Type of service:  Video Visit    Video End Time  (time video stopped): 824    Originating Location (pt. Location): Home    Distant Location (provider location):  [place] [facility]     Mode of Communication:  Video Conference via video platform: [email or web address]    Los Beck MD        Results by [facility]  Questions were elicited and answered.     Los Beck MD

## 2020-08-21 ENCOUNTER — VIRTUAL VISIT (OUTPATIENT)
Dept: OTHER | Facility: OUTPATIENT CENTER | Age: 40
End: 2020-08-21

## 2020-08-21 DIAGNOSIS — F64.0 GENDER DYSPHORIA IN ADOLESCENT AND ADULT: Primary | ICD-10-CM

## 2020-08-21 NOTE — PROGRESS NOTES
Telemedicine Visit: The client's condition can be safely assessed and treated via synchronous audio and visual telemedicine encounter.    Reason for Telemedicine Visit: COVID-19 restrictions.  Originating Site (Client Location): Client's home  Distant Site (Provider Location): Provider Remote Setting  Consent: The client/guardian has verbally consented to: the potential risks and benefits of telemedicine (video visit) versus in person care; bill my insurance or make self-payment for services provided; and responsibility for payment of non-covered services.   Mode of Communication: Video Conference via [employer]     As the provider I attest to compliance with applicable laws and regulations related to telemedicine.  Video start time: 8:00am  Video end time: 8:53am      [place] [facility] -  Case Progress Note     Date of Service: 8/21/20  Client Name: Jose Francois (name used: Candida - pronouns: she/her/her)  YOB: 1980  Age: 40 year old  MRN: [id number]  Gender/Gender Identity: Female  Treating Provider: Kenia Webb, PhD, Postdoctoral Fellow  Type of Session: Individual  Present in Session: Client only  Number of Minutes: 53 min     Current Symptoms/Status:     Gender Dysphoria: Gender and body dysphoria associated with fear and anxiety. Incongruence and distress between her biological sex and her experienced/expressed gender. Recurrent insecurities regarding her gender needs.      Progress Toward Treatment Goals:   Session # 5 - Client continues to show interest and commitment towards receiving services at [facility].  Continued working on her assignments.  Consistent progress in affirming her gender at work.    Intervention: Modality and Description:   Individual, interpersonal, CBT. The focus of today's session was supporting her to talking about her gender with her father.     Response to Intervention:  Client has made consistent progress in affirming her gender at work, she is confident about  it. She has been talking to her father and she has chosen to present her situation gradually. She has been genuine and assertive with her opinions in her interaction with her father. Continues to experience fears and sensations connected to her old gender identity.    Assignment:  No assignments was address today.     Interactive Complexity:  Communication difficulties present during current the psychiatric procedure include:  1. None.     Diagnosis:  302.85 (F64.0) Gender Dysphoria in Adolescents and Adults     Plan / Need for Future Services:  Return for therapy in two (2) weeks.      Kenia Webb, PhD, Postdoctoral Fellow

## 2020-08-28 DIAGNOSIS — F64.0 GENDER DYSPHORIA IN ADOLESCENT AND ADULT: ICD-10-CM

## 2020-08-28 LAB — ESTRADIOL SERPL-MCNC: 76 PG/ML (ref 6–50)

## 2020-09-01 LAB
SHBG SERPL-SCNC: 50 NMOL/L (ref 11–80)
TESTOST FREE SERPL-MCNC: 0.2 NG/DL (ref 4.7–24.4)
TESTOST SERPL-MCNC: 15 NG/DL (ref 240–950)

## 2020-09-02 NOTE — PROGRESS NOTES
"  SUBJECTIVE:   Candida is a 40 year old adult who presents to clinic today to establish care and to discuss the following problem(s).    # Gender Dysphoria  - follows with Dr. Beck  - started HRT in 04/2020  - on Vivelle dot 0.1mg patch 2x/week, spironolactone 100mg daily, and recently started on finasteride 5mg daily     #  Neck Pain  - Onset: was doing \"plank walks\" and developed sudden pain in right upper back  - Duration: 2 days ago  - Location: right upper back between scapulae  - sharp pain , has gotten milder  - a little accompanying stiffness in neck    - Aggravated by: hasn't noticed anything but hasn't worked out since it started  - Alleviated by: hasn't tried anything   - Weakness/Numbness/Paresthesias: no       # Varicose Veins  - more noticeable around ankles in recent years  - not painful  - has lessened with HRT  - peak weight was 240lbs and then has lost significant weight intentional, down to 178 lbs on home scale  - going to the gym regularly      ROS: Denies fevers, chills, chest pain, difficulty breathing, abdominal pain    History reviewed. No pertinent past medical history.  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      I have reviewed the patient's past medical, surgical, family, and social history.     OBJECTIVE:   /83 (BP Location: Left arm, Patient Position: Sitting, Cuff Size: Adult Regular)   Pulse 79   Temp 98.4  F (36.9  C) (Skin)   Resp 14   Ht 1.8 m (5' 10.87\")   Wt 82.9 kg (182 lb 12 oz)   SpO2 96%   BMI 25.58 kg/m      Constitutional: well-appearing, appears stated age  Eyes: conjunctivae without erythema, sclera anicteric.   Skin: no rashes, lesions, or wounds  Psych: affect is full and appropriate, speech is fluent and non-pressured    Extremities: Multiple small dilated varicosities on foot. No pitting edema    Neck:  ROM: flexion: full, extension: full, lateral rotation: R-full/ L-full, side bend: R-full/L-full.   Pain reproduced somewhat with flexion and moreso with extension  Tenderness: Bony: no; Paraspinal: no; Muscular: yes present over right rhomboids, Scapula: no  Sensation: intact in bilateral upper extremities    ASSESSMENT AND PLAN:       (S29.012A) Strain of rhomboid muscle, initial encounter  (primary encounter diagnosis)  Comment: Pain consistent with rhomboid strain, likely from exercise routine. Recommend rest, ice/heat, NSAIDs. If not improving over next 2 weeks will send to PT.   Plan:    (I87.8) Lower extremity venous stasis  Comment: Now much improved with weight loss. Discussed compression stockings if starts to worsen again. Can refer to cosmetic clinic at  if desires vein ablation for visible varicosities.   Plan:     Ivan Rodriguez MD   [facility]  09/06/2020, 5:47 PM  "

## 2020-09-04 ENCOUNTER — VIRTUAL VISIT (OUTPATIENT)
Dept: OTHER | Facility: OUTPATIENT CENTER | Age: 40
End: 2020-09-04
Payer: COMMERCIAL

## 2020-09-04 ENCOUNTER — OFFICE VISIT (OUTPATIENT)
Dept: FAMILY MEDICINE | Facility: CLINIC | Age: 40
End: 2020-09-04
Payer: COMMERCIAL

## 2020-09-04 VITALS
HEIGHT: 71 IN | BODY MASS INDEX: 25.58 KG/M2 | HEART RATE: 79 BPM | DIASTOLIC BLOOD PRESSURE: 83 MMHG | RESPIRATION RATE: 14 BRPM | OXYGEN SATURATION: 96 % | SYSTOLIC BLOOD PRESSURE: 114 MMHG | TEMPERATURE: 98.4 F | WEIGHT: 182.75 LBS

## 2020-09-04 DIAGNOSIS — I87.8 LOWER EXTREMITY VENOUS STASIS: ICD-10-CM

## 2020-09-04 DIAGNOSIS — S29.012A STRAIN OF RHOMBOID MUSCLE, INITIAL ENCOUNTER: Primary | ICD-10-CM

## 2020-09-04 DIAGNOSIS — F64.0 GENDER DYSPHORIA IN ADOLESCENT AND ADULT: Primary | ICD-10-CM

## 2020-09-04 ASSESSMENT — MIFFLIN-ST. JEOR: SCORE: 1753.95

## 2020-09-04 NOTE — NURSING NOTE
"40 year old  Chief Complaint   Patient presents with     Transgender Care     needs PCP per Kiran     Varicose Vein     bilateral, concerned it's from crossing her legs all the time     Neck Pain     pinched something in neck and right shoulder while working out x 2 days       Blood pressure 114/83, pulse 79, temperature 98.4  F (36.9  C), temperature source Skin, resp. rate 14, height 1.8 m (5' 10.87\"), weight 82.9 kg (182 lb 12 oz), SpO2 96 %. Body mass index is 25.58 kg/m .  Patient Active Problem List   Diagnosis     Gender dysphoria in adolescent and adult       Wt Readings from Last 2 Encounters:   09/04/20 82.9 kg (182 lb 12 oz)     BP Readings from Last 3 Encounters:   09/04/20 114/83   06/09/20 120/70         Current Outpatient Medications   Medication     cetirizine (ZYRTEC) 10 MG tablet     estradiol (VIVELLE-DOT) 0.1 MG/24HR bi-weekly patch     finasteride (PROSCAR) 5 MG tablet     fish oil-omega-3 fatty acids 1000 MG capsule     multivitamin w/minerals (MULTI-VITAMIN) tablet     spironolactone (ALDACTONE) 100 MG tablet     No current facility-administered medications for this visit.        Social History     Tobacco Use     Smoking status: Never Smoker     Smokeless tobacco: Never Used   Substance Use Topics     Alcohol use: Not Currently     Drug use: Never       Health Maintenance Due   Topic Date Due     PREVENTIVE CARE VISIT  1979     MENTAL HEALTH TX PLAN  1979     HIV SCREENING  04/30/1994     DTAP/TDAP/TD IMMUNIZATION (3 - Td) 03/27/2017     INFLUENZA VACCINE (1) 09/01/2020       No results found for: PAP      September 4, 2020 10:27 AM    "

## 2020-09-04 NOTE — PROGRESS NOTES
Telemedicine Visit: The client's condition can be safely assessed and treated via synchronous audio and visual telemedicine encounter.    Reason for Telemedicine Visit: COVID-19 restrictions.  Originating Site (Client Location): Client's home  Distant Site (Provider Location): Provider Remote Setting  Consent: The client/guardian has verbally consented to: the potential risks and benefits of telemedicine (video visit) versus in person care; bill my insurance or make self-payment for services provided; and responsibility for payment of non-covered services.   Mode of Communication: Video Conference via [employer]     As the provider I attest to compliance with applicable laws and regulations related to telemedicine.  Video start time: 2:00pm  Video end time: 2:53pm      [facility] -  Case Progress Note     Date of Service: 9/04/20  Client Name: Jose Francois (name used: Candida - pronouns: she/her/her)  YOB: 1980  Age: 40 year old  MRN: [id number]  Gender/Gender Identity: Female  Treating Provider: Kenia Webb, PhD, Postdoctoral Fellow  Type of Session: Individual  Present in Session: Client only  Number of Minutes: 53 min     Current Symptoms/Status:     Gender Dysphoria: Gender and body dysphoria associated with fear and anxiety. Incongruence and distress between her biological sex and her experienced/expressed gender. Recurrent insecurities regarding her gender needs. Body and facial hair have been trigger her gender dysphoria recurrent since she has been presenting herself as woman at work.     Progress Toward Treatment Goals:   Session # 6 - Client continues to show interest and commitment towards receiving services at [facility].  Continued working on her assignments.  Consistent progress in affirming her gender at work.    Intervention: Modality and Description:   Individual, interpersonal, CBT. The focus of today's session was supporting her to talking about her gender with her  father.     Response to Intervention:  Client reported a situation with a client at work and how she handled it emotionally. The situation deepened a discussion of her emotional regulation and self-confidence. She recognized that talking to her father about her identity will require that she be in balance, connected with the discussed points. She has been invited to a supervisory role and she is studying whether that possibility will align with current personal plans. She has been exploring the possibility of hair removal with her insurance.     Assignment:  No assignments was address today.     Interactive Complexity:  Communication difficulties present during current the psychiatric procedure include:  1. None.     Diagnosis:  302.85 (F64.0) Gender Dysphoria in Adolescents and Adults     Plan / Need for Future Services:  Return for therapy in two (2) weeks.      Kenia Webb, PhD, Postdoctoral Fellow

## 2020-09-06 RX ORDER — GINGER ROOT 550 MG
1100 CAPSULE ORAL DAILY
COMMUNITY

## 2020-09-18 ENCOUNTER — VIRTUAL VISIT (OUTPATIENT)
Dept: OTHER | Facility: OUTPATIENT CENTER | Age: 40
End: 2020-09-18
Payer: COMMERCIAL

## 2020-09-18 DIAGNOSIS — F64.0 GENDER DYSPHORIA IN ADOLESCENT AND ADULT: Primary | ICD-10-CM

## 2020-09-18 NOTE — PROGRESS NOTES
Telemedicine Visit: The client's condition can be safely assessed and treated via synchronous audio and visual telemedicine encounter.    Reason for Telemedicine Visit: COVID-19 restrictions.  Originating Site (Client Location): Client's home  Distant Site (Provider Location): Provider Remote Setting  Consent: The client/guardian has verbally consented to: the potential risks and benefits of telemedicine (video visit) versus in person care; bill my insurance or make self-payment for services provided; and responsibility for payment of non-covered services.   Mode of Communication: Video Conference via [employer]     As the provider I attest to compliance with applicable laws and regulations related to telemedicine.  Video start time: 10:00am  Video end time: 10:53am      [facility] -  Case Progress Note     Date of Service: 9/18/20  Client Name: Jose Francois (name used: Candida - pronouns: she/her/her)  YOB: 1980  Age: 40 year old  MRN: [id number]  Gender/Gender Identity: Female  Treating Provider: Kenia Webb, PhD, Postdoctoral Fellow  Type of Session: Individual  Present in Session: Client only  Number of Minutes: 53 min     Current Symptoms/Status:     Gender Dysphoria: Gender and body dysphoria associated with fear and anxiety. Incongruence and distress between her biological sex and her experienced/expressed gender. Recurrent insecurities regarding her gender needs. Body and facial hair have been trigger her gender dysphoria recurrent since she has been presenting herself as woman at work.     Progress Toward Treatment Goals:   Session # 7 - Client continues to show interest and commitment towards receiving services at [facility].  Continued working on her assignments.  Consistent progress in affirming her gender at work.    Intervention: Modality and Description:   Individual, interpersonal, CBT. The focus of today's session was exploring her initial experience of presenting her  gender identity to his father.     Response to Intervention:  Client had a conversation with her father and she was able to fully present herself in her gender identity. The conversation was very positive for her and removed her fears that she had about presenting her identity. The father was supportive and, since she was a child, he knew that there was something wrong but that he did not know what it was. She was surprised that he kept it that long and not get help about it. It was significant to her that he apologized for not being able to help her before. Future plans and possible changes to support her affirming her identity in other contexts and people were explored.    Assignment:  No assignments was address today.     Interactive Complexity:  Communication difficulties present during current the psychiatric procedure include:  1. None.     Diagnosis:  302.85 (F64.0) Gender Dysphoria in Adolescents and Adults     Plan / Need for Future Services:  Return for therapy in two (2) weeks.      Kenia Webb, PhD, Postdoctoral Fellow

## 2020-09-20 NOTE — PROGRESS NOTES
I did not personally see the patient. I reviewed and agree with the assessment and plan of this note.     Irena Mukherjee, PhD, LMFT

## 2020-09-21 DIAGNOSIS — F64.0 GENDER DYSPHORIA IN ADOLESCENT AND ADULT: Primary | ICD-10-CM

## 2020-09-21 NOTE — RESULT ENCOUNTER NOTE
Dear Cnadida,     Here are your recent results. Your testosterone was suppressed, and your estradiol level was at the lower end for the female range. Since you did the tests a few weeks early, we probably should repeat them later this month. I will put in another estradiol lab    Please let us know if you have any questions or concerns.    Regards,  Los Beck MD

## 2020-10-02 ENCOUNTER — VIRTUAL VISIT (OUTPATIENT)
Dept: PSYCHOLOGY | Facility: CLINIC | Age: 40
End: 2020-10-02
Payer: COMMERCIAL

## 2020-10-02 DIAGNOSIS — F64.0 GENDER DYSPHORIA IN ADOLESCENT AND ADULT: Primary | ICD-10-CM

## 2020-10-02 PROCEDURE — 90837 PSYTX W PT 60 MINUTES: CPT | Mod: U7 | Performed by: STUDENT IN AN ORGANIZED HEALTH CARE EDUCATION/TRAINING PROGRAM

## 2020-10-02 PROCEDURE — 99207 PR NO CHARGE LOS: CPT | Performed by: STUDENT IN AN ORGANIZED HEALTH CARE EDUCATION/TRAINING PROGRAM

## 2020-10-05 NOTE — PROGRESS NOTES
Telemedicine Visit: The client's condition can be safely assessed and treated via synchronous audio and visual telemedicine encounter.    Reason for Telemedicine Visit: COVID-19 restrictions.  Originating Site (Client Location): Client's home  Distant Site (Provider Location): Provider Remote Setting  Consent: The client/guardian has verbally consented to: the potential risks and benefits of telemedicine (video visit) versus in person care; bill my insurance or make self-payment for services provided; and responsibility for payment of non-covered services.   Mode of Communication: Video Conference via [employer]     As the provider I attest to compliance with applicable laws and regulations related to telemedicine.  Video start time: 11:30am  Video end time: 12:23pm      [facility] -  Case Progress Note     Date of Service: 10/02/20  Client Name: Jose Francois (name used: Candida - pronouns: she/her/her)  YOB: 1980  Age: 40 year old  MRN: [id number]  Gender/Gender Identity: Female  Treating Provider: Kenia Webb, PhD, Postdoctoral Fellow  Type of Session: Individual  Present in Session: Client only  Number of Minutes: 53 min     Current Symptoms/Status:     Gender Dysphoria: Gender and body dysphoria associated with fear and anxiety. Incongruence and distress between her biological sex and her experienced/expressed gender. Recurrent insecurities regarding her gender needs. Body and facial hair have been trigger her gender dysphoria recurrent since she has been presenting herself as woman at work.     Progress Toward Treatment Goals:   Session # 8 - Client continues to show interest and commitment towards receiving services at [facility].  Continued working on her assignments.  Consistent progress in affirming her gender at work.    Intervention: Modality and Description:   Individual, interpersonal, CBT. The focus of today's session was understanding her emotional state.     Response to  Intervention:  Client identified a state that she had difficulty understanding emotions and feelings. She used the session to explore the changes that she has been experiencing in her life. She noticed difficulties in disconnecting from work. She was able to regulate herself and prioritize personal aspects.    Assignment:  No assignments was address today.     Interactive Complexity:  Communication difficulties present during current the psychiatric procedure include:  1. None.     Diagnosis:  302.85 (F64.0) Gender Dysphoria in Adolescents and Adults     Plan / Need for Future Services:  Return for therapy in two (2) weeks.      Kenia Webb, PhD, Postdoctoral Fellow

## 2020-10-06 ENCOUNTER — MYC MEDICAL ADVICE (OUTPATIENT)
Dept: FAMILY MEDICINE | Facility: CLINIC | Age: 40
End: 2020-10-06

## 2020-10-12 ENCOUNTER — MEDICAL CORRESPONDENCE (OUTPATIENT)
Dept: HEALTH INFORMATION MANAGEMENT | Facility: CLINIC | Age: 40
End: 2020-10-12

## 2020-10-14 ENCOUNTER — MYC MEDICAL ADVICE (OUTPATIENT)
Dept: FAMILY MEDICINE | Facility: CLINIC | Age: 40
End: 2020-10-14

## 2020-10-16 ENCOUNTER — VIRTUAL VISIT (OUTPATIENT)
Dept: PSYCHOLOGY | Facility: CLINIC | Age: 40
End: 2020-10-16
Payer: COMMERCIAL

## 2020-10-16 DIAGNOSIS — F64.0 GENDER DYSPHORIA IN ADOLESCENT AND ADULT: ICD-10-CM

## 2020-10-16 DIAGNOSIS — F64.0 GENDER DYSPHORIA IN ADOLESCENT AND ADULT: Primary | ICD-10-CM

## 2020-10-16 LAB — ESTRADIOL SERPL-MCNC: 131 PG/ML (ref 6–50)

## 2020-10-16 PROCEDURE — 90837 PSYTX W PT 60 MINUTES: CPT | Mod: U7 | Performed by: STUDENT IN AN ORGANIZED HEALTH CARE EDUCATION/TRAINING PROGRAM

## 2020-10-16 PROCEDURE — 99207 PR NO CHARGE LOS: CPT | Performed by: STUDENT IN AN ORGANIZED HEALTH CARE EDUCATION/TRAINING PROGRAM

## 2020-10-19 NOTE — PROGRESS NOTES
Telemedicine Visit: The client's condition can be safely assessed and treated via synchronous audio and visual telemedicine encounter.    Reason for Telemedicine Visit: COVID-19 restrictions.  Originating Site (Client Location): Client's home  Distant Site (Provider Location): Provider Remote Setting  Consent: The client/guardian has verbally consented to: the potential risks and benefits of telemedicine (video visit) versus in person care; bill my insurance or make self-payment for services provided; and responsibility for payment of non-covered services.   Mode of Communication: Video Conference via [employer]     As the provider I attest to compliance with applicable laws and regulations related to telemedicine.  Video start time: 8:00am  Video end time: 8:53am      [facility] -  Case Progress Note     Date of Service: 10/16/20  Client Name: Jose Francois (name used: Candida Francois - pronouns: she/her/her)  YOB: 1980  Age: 40 year old  MRN: [id number]  Gender/Gender Identity: Female  Treating Provider: Kenia Webb, PhD, Postdoctoral Fellow  Type of Session: Individual  Present in Session: Client only  Number of Minutes: 53 min     Current Symptoms/Status:     Gender Dysphoria: Gender and body dysphoria associated with fear and anxiety. Incongruence and distress between her biological sex and her experienced/expressed gender. Recurrent insecurities regarding her gender needs. Body and facial hair have been trigger her gender dysphoria recurrent since she has been presenting herself as woman at work.     Progress Toward Treatment Goals:   Session # 9 - Client continues to show interest and commitment towards receiving services at [facility].  Continued working on her assignments.  Consistent progress in affirming her gender at work.  Pursing for name and gender ming change process.  Started hair removal sessions.    Intervention: Modality and Description:   Individual,  interpersonal, CBT. The focus of today's session was on improving coping strategies and attitudes.     Response to Intervention:  Client explored strategies and ways to deal with misgendering situations at work. She initiated hair removal sessions and shared the importance of this step. Guided her with resources  She has not been able to regulate and cope regarding old habits and attitudes that connect with her old gender identity. Dysphoric feelings were addressed and new attitudes were explored with the client.    Assignment:  No assignments was address today.     Interactive Complexity:  Communication difficulties present during current the psychiatric procedure include:  1. None.     Diagnosis:  302.85 (F64.0) Gender Dysphoria in Adolescents and Adults     Plan / Need for Future Services:  Return for therapy in two (2) weeks.      Kenia Webb, PhD, Postdoctoral Fellow

## 2020-10-21 NOTE — PROGRESS NOTES
"  SUBJECTIVE:   Candida is a 40 year old adult who presents to clinic today for a return visit.    # Stye  - right lower eyelid  - red bump  - not painful  - sometimes forms \"whiteheads\"  - had earlier had larger higuera on inside of eyelid  - present for about a month  - just keeping face clean, no other interventions      # Skin problem  - black flat spot next to right nipple  - noticed a couple of weeks ago, unsure how long it has been there      ROS: Denies fevers, chills, chest pain, difficulty breathing, abdominal pain    Patient Active Problem List   Diagnosis     Gender dysphoria in adolescent and adult     Current Outpatient Medications   Medication     BIOTIN PO     cetirizine (ZYRTEC) 10 MG tablet     estradiol (VIVELLE-DOT) 0.1 MG/24HR bi-weekly patch     finasteride (PROSCAR) 5 MG tablet     Shae, Zingiber officinalis, (SHAE ROOT) 550 MG CAPS capsule     Hyaluronic Acid-Vitamin C (HYALURONIC ACID PO)     MAGNESIUM GLYCINATE PO     multivitamin w/minerals (MULTI-VITAMIN) tablet     Omega-3 Fatty Acids (FISH OIL PO)     spironolactone (ALDACTONE) 100 MG tablet     No current facility-administered medications for this visit.        I have reviewed the patient's relevant past medical history.     OBJECTIVE:   /80 (BP Location: Right arm, Patient Position: Sitting, Cuff Size: Adult Regular)   Pulse 66   Temp 97.7  F (36.5  C) (Skin)   Resp 15   Wt 83 kg (183 lb)   SpO2 98%   BMI 25.62 kg/m      Constitutional: well-appearing, appears stated age  Eyes: conjunctivae without erythema, sclera anicteric.  Two adjacent papule/pustules on medial right lower eyelid. Some reciprocal conjunctival injection on inside of eyelid in same area.   Skin: 3mm dark well-demarcated macule at 3 oclock just outside of right areola. Even coloration.   Psych: affect is full and appropriate, speech is fluent and non-pressured          ASSESSMENT AND PLAN:     (H00.12) Chalazion of right lower eyelid  (primary " encounter diagnosis)  Comment: Appearance consistent with chalazion. Warm compresses 10 minutes 4 times daily. If not improving at 2 weeks will send to ophthalmology. Advised to avoid eye makeup and eyelash extension on lower lid until resolution.   Plan:     (D22.5) Melanocytic nevus of trunk  Comment: New, atypical appearing nevus. Sending to derm for evaluation. Also interested in discussing cosmetic removal of benign moles on face.   Plan: DERMATOLOGY ADULT REFERRAL            Ivan Rodriguez MD   [facility]  10/23/2020, 8:25 AM

## 2020-10-23 ENCOUNTER — OFFICE VISIT (OUTPATIENT)
Dept: FAMILY MEDICINE | Facility: CLINIC | Age: 40
End: 2020-10-23
Payer: COMMERCIAL

## 2020-10-23 VITALS
BODY MASS INDEX: 25.62 KG/M2 | WEIGHT: 183 LBS | TEMPERATURE: 97.7 F | SYSTOLIC BLOOD PRESSURE: 124 MMHG | DIASTOLIC BLOOD PRESSURE: 80 MMHG | OXYGEN SATURATION: 98 % | RESPIRATION RATE: 15 BRPM | HEART RATE: 66 BPM

## 2020-10-23 DIAGNOSIS — D22.5 MELANOCYTIC NEVUS OF TRUNK: ICD-10-CM

## 2020-10-23 DIAGNOSIS — H00.12 CHALAZION OF RIGHT LOWER EYELID: Primary | ICD-10-CM

## 2020-10-23 NOTE — NURSING NOTE
40 year old  Chief Complaint   Patient presents with     Eye Problem Right Eye     Stye x 1 month does have some drainage       Blood pressure 124/80, pulse 66, temperature 97.7  F (36.5  C), temperature source Skin, resp. rate 15, weight 83 kg (183 lb), SpO2 98 %. Body mass index is 25.62 kg/m .  Patient Active Problem List   Diagnosis     Gender dysphoria in adolescent and adult       Wt Readings from Last 2 Encounters:   10/23/20 83 kg (183 lb)   09/04/20 82.9 kg (182 lb 12 oz)     BP Readings from Last 3 Encounters:   10/23/20 124/80   09/04/20 114/83   06/09/20 120/70         Current Outpatient Medications   Medication     BIOTIN PO     cetirizine (ZYRTEC) 10 MG tablet     estradiol (VIVELLE-DOT) 0.1 MG/24HR bi-weekly patch     finasteride (PROSCAR) 5 MG tablet     Shae, Zingiber officinalis, (SHAE ROOT) 550 MG CAPS capsule     Hyaluronic Acid-Vitamin C (HYALURONIC ACID PO)     MAGNESIUM GLYCINATE PO     multivitamin w/minerals (MULTI-VITAMIN) tablet     Omega-3 Fatty Acids (FISH OIL PO)     spironolactone (ALDACTONE) 100 MG tablet     No current facility-administered medications for this visit.        Social History     Tobacco Use     Smoking status: Never Smoker     Smokeless tobacco: Never Used   Substance Use Topics     Alcohol use: Not Currently     Drug use: Never       Health Maintenance Due   Topic Date Due     PREVENTIVE CARE VISIT  1979     MENTAL HEALTH TX PLAN  1979     HIV SCREENING  04/30/1994       No results found for: PAP      October 23, 2020 8:06 AM

## 2020-10-26 ENCOUNTER — MYC MEDICAL ADVICE (OUTPATIENT)
Dept: FAMILY MEDICINE | Facility: CLINIC | Age: 40
End: 2020-10-26

## 2020-10-30 ENCOUNTER — VIRTUAL VISIT (OUTPATIENT)
Dept: PSYCHOLOGY | Facility: CLINIC | Age: 40
End: 2020-10-30
Payer: COMMERCIAL

## 2020-10-30 DIAGNOSIS — F64.0 GENDER DYSPHORIA IN ADOLESCENT AND ADULT: Primary | ICD-10-CM

## 2020-10-30 PROCEDURE — 90837 PSYTX W PT 60 MINUTES: CPT | Mod: U7 | Performed by: STUDENT IN AN ORGANIZED HEALTH CARE EDUCATION/TRAINING PROGRAM

## 2020-10-30 PROCEDURE — 99207 PR NO CHARGE LOS: CPT | Performed by: STUDENT IN AN ORGANIZED HEALTH CARE EDUCATION/TRAINING PROGRAM

## 2020-10-30 NOTE — PROGRESS NOTES
Telemedicine Visit: The client's condition can be safely assessed and treated via synchronous audio and visual telemedicine encounter.    Reason for Telemedicine Visit: COVID-19 restrictions.  Originating Site (Client Location): Client's home  Distant Site (Provider Location): Provider Remote Setting  Consent: The client/guardian has verbally consented to: the potential risks and benefits of telemedicine (video visit) versus in person care; bill my insurance or make self-payment for services provided; and responsibility for payment of non-covered services.   Mode of Communication: Video Conference via [employer]     As the provider I attest to compliance with applicable laws and regulations related to telemedicine.  Video start time: 8:00am  Video end time: 8:53am      [facility] Sexual Health -  Case Progress Note     Date of Service: 10/30/20  Client Name: Jose Francois (name used: Candida Francois - pronouns: she/her/her)  YOB: 1980  Age: 40 year old  MRN: [id number]  Gender/Gender Identity: Female  Treating Provider: Kenia Webb, PhD, Postdoctoral Fellow  Type of Session: Individual  Present in Session: Client only  Number of Minutes: 53 min     Current Symptoms/Status:     Gender Dysphoria: Gender and body dysphoria associated with fear and anxiety. Incongruence and distress between her biological sex and her experienced/expressed gender. Recurrent insecurities regarding her gender needs. Body and facial hair have been trigger her gender dysphoria recurrent since she has been presenting herself as woman at work. Her gender dysphoria regarding her voice has been a recurrent issue.     Progress Toward Treatment Goals:   Session # 10 - Client continues to show interest and commitment towards receiving services at [facility].  Continued working on her assignments.  Consistent progress in affirming her gender at work.  Pursing for name and gender ming change process.  Started hair removal  sessions.    Intervention: Modality and Description:   Individual, interpersonal, CBT. The focus of today's session was on recognizing internal sources of her progress.     Response to Intervention:  Client is having difficulties internally recognizing her progress regarding her transitioning and her life situation, such as her career. She considered that her career has advanced since the moment she has presented herself in her identity at work. The discussion helped her to list internal aspects in her mind and she was able to identify points to be strengthened. Sustainability in behaviors to help her concretely reinforce internal aspects was addressed. She considered that she overcame the fear of rejection by being completely accepted by her father regarding her gender identity.    Assignment:  No assignments was address today.     Interactive Complexity:  Communication difficulties present during current the psychiatric procedure include:  1. None.     Diagnosis:  302.85 (F64.0) Gender Dysphoria in Adolescents and Adults     Plan / Need for Future Services:  Return for therapy in two (2) weeks.      Kenia Webb, PhD, Postdoctoral Fellow

## 2020-11-05 ENCOUNTER — MYC MEDICAL ADVICE (OUTPATIENT)
Dept: FAMILY MEDICINE | Facility: CLINIC | Age: 40
End: 2020-11-05

## 2020-11-09 ENCOUNTER — VIRTUAL VISIT (OUTPATIENT)
Dept: FAMILY MEDICINE | Facility: CLINIC | Age: 40
End: 2020-11-09
Payer: COMMERCIAL

## 2020-11-09 ENCOUNTER — MEDICAL CORRESPONDENCE (OUTPATIENT)
Dept: HEALTH INFORMATION MANAGEMENT | Facility: CLINIC | Age: 40
End: 2020-11-09

## 2020-11-09 ENCOUNTER — VIRTUAL VISIT (OUTPATIENT)
Dept: PSYCHOLOGY | Facility: CLINIC | Age: 40
End: 2020-11-09
Payer: COMMERCIAL

## 2020-11-09 VITALS — WEIGHT: 176.6 LBS | BODY MASS INDEX: 24.72 KG/M2

## 2020-11-09 DIAGNOSIS — F64.0 GENDER DYSPHORIA IN ADOLESCENT AND ADULT: Primary | ICD-10-CM

## 2020-11-09 PROCEDURE — 99207 PR NO CHARGE LOS: CPT | Performed by: STUDENT IN AN ORGANIZED HEALTH CARE EDUCATION/TRAINING PROGRAM

## 2020-11-09 PROCEDURE — 99214 OFFICE O/P EST MOD 30 MIN: CPT | Mod: 95 | Performed by: FAMILY MEDICINE

## 2020-11-09 PROCEDURE — 90837 PSYTX W PT 60 MINUTES: CPT | Mod: U7 | Performed by: STUDENT IN AN ORGANIZED HEALTH CARE EDUCATION/TRAINING PROGRAM

## 2020-11-09 ASSESSMENT — ENCOUNTER SYMPTOMS
UNEXPECTED WEIGHT CHANGE: 0
PALPITATIONS: 0
ABDOMINAL PAIN: 0
DYSPHORIC MOOD: 0
HEADACHES: 0
VOMITING: 0
POLYDIPSIA: 0
WEAKNESS: 0
FEVER: 0
CHEST TIGHTNESS: 0
NUMBNESS: 0
SHORTNESS OF BREATH: 0
LIGHT-HEADEDNESS: 0
NERVOUS/ANXIOUS: 0
FREQUENCY: 0
CHILLS: 0

## 2020-11-09 NOTE — PROGRESS NOTES
"The patient has been notified of following:     \"This video visit will be conducted via a call between you and your physician/provider. We have found that certain health care needs can be provided without the need for an in-person physical exam.  This service lets us provide the care you need with a video conversation.  If a prescription is necessary we can send it directly to your pharmacy.  If lab work is needed we can place an order for that and you can then stop by our lab to have the test done at a later time.    If during the course of the call the physician/provider feels a video visit is not appropriate, you will not be charged for this service.\"     Patient has given verbal consent for Video visit? Yes    Patient would like the video invitation sent by: Other e-mail: [employer]    Video Start Time: 7:59 AM              DELIA Tirado is a 40 year old individual that uses pronouns She/Her/Hers/Herself that presents today for follow up of:  feminizing hormone therapy.   Alone or accompanied by: accompanied today by self  Gender identity: female  Started Hormone  therapy  4/2020  Continues on Vivelle dot 0.1 x 2 mg patch 2x/wk and Spironlactone 100* mg daily  finasteride 5 mg   Any special concerns today?    Occ. Bouts of dizziness, but not drinking enough fluids, busy at work      On hormones?  YES +++ Shot day of the week? Not applicable-taking pills/patch/gel      Due for labs?  Yes      +++ Refills of meds needed?  Yes  Gender related body changes since last visit:   Breast growth continues, discussed breast measurement and timelines for growth  Starting up , trying to get to mid 150's  Eating 1200 calorie/day , constantly hungry,     Plans to talk with nutritionist to discuss above    Breakthrough bleeding? Does Not Apply    New health concerns since last visit:  ---none    Past Surgical History:   Procedure Laterality Date     HC TOOTH EXTRACTION W/FORCEP         Patient Active Problem List "   Diagnosis     Gender dysphoria in adolescent and adult       Current Outpatient Medications   Medication Sig Dispense Refill     BIOTIN PO Take 5,000 mcg by mouth        cetirizine (ZYRTEC) 10 MG tablet Take 10 mg by mouth daily       estradiol (VIVELLE-DOT) 0.1 MG/24HR bi-weekly patch Place 2 patches onto the skin twice a week 16 patch 3     finasteride (PROSCAR) 5 MG tablet Take 1 tablet (5 mg) by mouth daily 30 tablet 3     Shae, Zingiber officinalis, (SHAE ROOT) 550 MG CAPS capsule Take 1,100 mg by mouth daily       Hyaluronic Acid-Vitamin C (HYALURONIC ACID PO)        MAGNESIUM GLYCINATE PO Take 400 mg by mouth daily       multivitamin w/minerals (MULTI-VITAMIN) tablet Take 1 tablet by mouth daily       Omega-3 Fatty Acids (FISH OIL PO) Take 1,400 mg by mouth daily        spironolactone (ALDACTONE) 100 MG tablet Take 1 tablet (100 mg) by mouth daily 30 tablet 3       History   Smoking Status     Never Smoker   Smokeless Tobacco     Never Used        No Known Allergies    Health Maintenance Due   Topic Date Due     MENTAL HEALTH TX PLAN  1980         Problem, Medication and Allergy Lists were reviewed and are current..         Review of Systems:   Review of Systems   Constitutional: Negative for chills, fever and unexpected weight change.   Eyes: Negative for visual disturbance.   Respiratory: Negative for chest tightness and shortness of breath.    Cardiovascular: Negative for chest pain, palpitations and leg swelling.   Gastrointestinal: Negative for abdominal pain and vomiting.   Endocrine: Negative for polydipsia and polyuria.   Genitourinary: Negative for frequency.   Neurological: Negative for weakness, light-headedness, numbness and headaches.   Psychiatric/Behavioral: Negative for dysphoric mood and suicidal ideas. The patient is not nervous/anxious.               Labs:   Results from last visit:  Orders Only on 10/16/2020   Component Date Value Ref Range Status     Estradiol 10/16/2020 131*  6 - 50 pg/mL Final         EXAM:  Wt,. 176 lb  Constitutional: healthy, alert and no distress   Respiratory: negative, Percussion normal. Good diaphragmatic excursion. Lungs clear  Psychiatric: mentation appears normal and affect normal/bright     Assessment and Plan   1. Gender dysphoria  2. Nutrition concerns  Clinically appropriate response to current hormone dose without signficant side effects  Counseled patient on need for body fat percentage to develop curves, focus on goals for body health and body image than on a specific weight per se  Instructed patient in breast measurements if she wishes to folllow them,  Can research surgeons to prepare for future surgeries    Labs: CMP, lipids    Follow up:  Follow up in 3 months.    Total time spent face to face with the patient was over 25 minutes. More than 50% of the time spent with the patient involved counseling and/or coordinating care.on the issues documented above. Other items discussed included, but were not limited to, prognosis, differential diagnosis, risks/benefits of treatment, instructions regarding medication and importance of compliance, risk reduction, as well as discussion and coordination of care with other health care providers and patient s care giver.          Video-Visit Details    Type of service:  Video Visit    Video End Time (time video stopped): 826    Originating Location (pt. Location): Home    Distant Location (provider location):  [facility]     Mode of Communication:  Video Conference via video platform: Rakesh Beck MD        Results by joce  Questions were elicited and answered.     Los Beck MD

## 2020-11-10 NOTE — PROGRESS NOTES
Telemedicine Visit: The client's condition can be safely assessed and treated via synchronous audio and visual telemedicine encounter.    Reason for Telemedicine Visit: COVID-19 restrictions.  Originating Site (Client Location): Client's home  Distant Site (Provider Location): Provider Remote Setting  Consent: The client/guardian has verbally consented to: the potential risks and benefits of telemedicine (video visit) versus in person care; bill my insurance or make self-payment for services provided; and responsibility for payment of non-covered services.   Mode of Communication: Video Conference via [employer]Â®     As the provider I attest to compliance with applicable laws and regulations related to telemedicine.  Video start time: 9:00am  Video end time: 9:53am      [facility] Sexual Health -  Case Progress Note     Date of Service: 11/09/20  Client Name: Jose Francois (name used: Candida Francois - pronouns: she/her/her)  YOB: 1980  Age: 40 year old  MRN: [id number]  Gender/Gender Identity: Female  Treating Provider: Kenia Webb, PhD, Postdoctoral Fellow  Type of Session: Individual  Present in Session: Client only  Number of Minutes: 53 min     Current Symptoms/Status:     Gender Dysphoria: Gender and body dysphoria associated with fear and anxiety. Incongruence and distress between her biological sex and her experienced/expressed gender. Recurrent insecurities regarding her gender needs. Body and facial hair have been trigger her gender dysphoria recurrent since she has been presenting herself as woman at work. Her gender dysphoria regarding her voice has been a recurrent issue.     Progress Toward Treatment Goals:   Session # 11 - Client continues to show interest and commitment towards receiving services at [facility].  Continued working on her assignments.  Consistent progress in affirming her gender at work.  Pursing for name and gender ming change process.  Started hair removal  sessions.    Intervention: Modality and Description:   Individual, interpersonal, CBT. The focus of today's session was on improving coping strategies and attitudes.     Response to Intervention:  Client started her documentation process for the name change and gender ming. It was important for her to recognize that her transitioning is happening at the speed she expected. Possible frustrations and delays were discussed. She used the session to understand possible limitations that she will have regarding body transitioning. Self-image outcomes were addressed using an avatar picture as a model. Activities to recognize her self-image were oriented. Client expressed clarification regarding the difference between gender issues from self-image problems.    Assignment:  No assignments was address today.     Interactive Complexity:  Communication difficulties present during current the psychiatric procedure include:  1. None.     Diagnosis:  302.85 (F64.0) Gender Dysphoria in Adolescents and Adults     Plan / Need for Future Services:  Return for therapy in two (2) weeks.      Kenia Webb, PhD, Postdoctoral Fellow

## 2020-11-16 ENCOUNTER — MYC MEDICAL ADVICE (OUTPATIENT)
Dept: FAMILY MEDICINE | Facility: CLINIC | Age: 40
End: 2020-11-16

## 2020-11-23 ENCOUNTER — VIRTUAL VISIT (OUTPATIENT)
Dept: PSYCHOLOGY | Facility: CLINIC | Age: 40
End: 2020-11-23
Payer: COMMERCIAL

## 2020-11-23 DIAGNOSIS — F64.0 GENDER DYSPHORIA IN ADOLESCENT AND ADULT: Primary | ICD-10-CM

## 2020-11-23 PROCEDURE — 90837 PSYTX W PT 60 MINUTES: CPT | Mod: U7 | Performed by: STUDENT IN AN ORGANIZED HEALTH CARE EDUCATION/TRAINING PROGRAM

## 2020-11-23 PROCEDURE — 99207 PR NO CHARGE LOS: CPT | Performed by: STUDENT IN AN ORGANIZED HEALTH CARE EDUCATION/TRAINING PROGRAM

## 2020-11-24 NOTE — PROGRESS NOTES
Telemedicine Visit: The client's condition can be safely assessed and treated via synchronous audio and visual telemedicine encounter.    Reason for Telemedicine Visit: COVID-19 restrictions.  Originating Site (Client Location): Client's home  Distant Site (Provider Location): Provider Remote Setting  Consent: The client/guardian has verbally consented to: the potential risks and benefits of telemedicine (video visit) versus in person care; bill my insurance or make self-payment for services provided; and responsibility for payment of non-covered services.   Mode of Communication: Video Conference via Zoom platform because platform [employer] did not work. Client indicated verbal consent about the potential risks in not using [employer]. A secure password link for the session was generated by the [email or web address] account     As the provider I attest to compliance with applicable laws and regulations related to telemedicine.  Video start time: 9:00am  Video end time: 9:53am      [place] [facility] -  Case Progress Note     Date of Service: 11/23/20  Client Name: Jose Francois (name used: Candida Francois - pronouns: she/her/her)  YOB: 1980  Age: 40 year old  MRN: [id number]  Gender/Gender Identity: Female  Treating Provider: Kenia Webb, PhD, Postdoctoral Fellow  Type of Session: Individual  Present in Session: Client only  Number of Minutes: 53 min     Current Symptoms/Status:     Gender Dysphoria: Gender and body dysphoria associated with fear and anxiety. Incongruence and distress between her biological sex and her experienced/expressed gender. Recurrent insecurities regarding her gender needs. Body and facial hair have been trigger her gender dysphoria recurrent since she has been presenting herself as woman at work. Her gender dysphoria regarding her voice has been a recurrent issue.     Progress Toward Treatment Goals:   Session # 12 - Client continues  to show interest and commitment towards receiving services at [facility].  Treatment plan established in 7/24/2020.  Continued working on her assignments.  Consistent progress in affirming her gender at work.  Pursing for name and gender ming change process.  Started hair removal sessions.    Intervention: Modality and Description:   Individual, interpersonal, CBT. The focus of today's session was on talking about her identity at work place.     Response to Intervention:  Client got some feedbacks regarding her performance and identity at work and she sought insights and understanding on that. She noticed some incompatibilities regarding the expectations that her work has regarding her identity. She realized that managing emotions needs to be adjusted so that she knows how to protect herself from other people and customers. Strategies were discussed about managing emotions and she said that the discussion was helpful for her. She also needs to make it clear to management that her transition has made her think of new positions and operations that she would like to perform at work.     Assignment:  No assignments was address today.     Interactive Complexity:  Communication difficulties present during current the psychiatric procedure include:  1. None.     Diagnosis:  302.85 (F64.0) Gender Dysphoria in Adolescents and Adults     Plan / Need for Future Services:  Return for therapy in two (2) weeks.      Kenia Webb, PhD, Postdoctoral Fellow

## 2020-11-30 ENCOUNTER — TRANSFERRED RECORDS (OUTPATIENT)
Dept: HEALTH INFORMATION MANAGEMENT | Facility: CLINIC | Age: 40
End: 2020-11-30

## 2020-12-01 ENCOUNTER — DOCUMENTATION ONLY (OUTPATIENT)
Dept: FAMILY MEDICINE | Facility: CLINIC | Age: 40
End: 2020-12-01

## 2020-12-03 ENCOUNTER — MYC MEDICAL ADVICE (OUTPATIENT)
Dept: FAMILY MEDICINE | Facility: CLINIC | Age: 40
End: 2020-12-03

## 2020-12-07 ENCOUNTER — VIRTUAL VISIT (OUTPATIENT)
Dept: PSYCHOLOGY | Facility: CLINIC | Age: 40
End: 2020-12-07
Payer: COMMERCIAL

## 2020-12-07 ENCOUNTER — DOCUMENTATION ONLY (OUTPATIENT)
Dept: OTHER | Facility: CLINIC | Age: 40
End: 2020-12-07

## 2020-12-07 ENCOUNTER — MYC REFILL (OUTPATIENT)
Dept: OTHER | Facility: OUTPATIENT CENTER | Age: 40
End: 2020-12-07

## 2020-12-07 DIAGNOSIS — F64.0 GENDER DYSPHORIA IN ADOLESCENT AND ADULT: ICD-10-CM

## 2020-12-07 DIAGNOSIS — F64.0 GENDER DYSPHORIA IN ADOLESCENT AND ADULT: Primary | ICD-10-CM

## 2020-12-07 PROCEDURE — 90837 PSYTX W PT 60 MINUTES: CPT | Mod: U7 | Performed by: STUDENT IN AN ORGANIZED HEALTH CARE EDUCATION/TRAINING PROGRAM

## 2020-12-07 NOTE — PROGRESS NOTES
Telemedicine Visit: The client's condition can be safely assessed and treated via synchronous audio and visual telemedicine encounter.    Reason for Telemedicine Visit: COVID-19 restrictions.  Originating Site (Client Location): Client's home  Distant Site (Provider Location): Provider Remote Setting  Consent: The client/guardian has verbally consented to: the potential risks and benefits of telemedicine (video visit) versus in person care; bill my insurance or make self-payment for services provided; and responsibility for payment of non-covered services.   Mode of Communication: Video Conference via Zoom platform because platform [employer] did not work. Client indicated verbal consent about the potential risks in not using [facility]. A secure password link for the session was generated by the [email or web address] provider account     As the provider I attest to compliance with applicable laws and regulations related to telemedicine.  Video start time: 9:00am  Video end time: 9:53am      [place] [facility] -  Case Progress Note     Date of Service: 12/07/20  Client Name: Candida Francois (pronouns: she/her/her)  YOB: 1980  Age: 40 year old  MRN: [id number]  Gender/Gender Identity: Female  Treating Provider: Kenia Webb, PhD, Postdoctoral Fellow  Type of Session: Individual  Present in Session: Client only  Number of Minutes: 53 min     Current Symptoms/Status:     Gender Dysphoria: Gender and body dysphoria associated with fear and anxiety. Incongruence and distress between her biological sex and her experienced/expressed gender. Recurrent insecurities regarding her gender needs. Body and facial hair have been trigger her gender dysphoria recurrent since she has been presenting herself as woman at work. Her gender dysphoria regarding her voice has been a recurrent issue.     Progress Toward Treatment Goals:   Session # 13 - Client continues to show interest and commitment  towards receiving services at [facility].  Treatment plan established in 7/24/2020.  Continued working on her assignments.  Consistent progress in affirming her gender at work.  Client legally changed the name and gender marker (12/03/2020).  Started hair removal sessions.    Intervention: Modality and Description:   Individual, interpersonal, CBT. The focus of today's session was on celebrating her achievements and addressing body image experiences.    Response to Intervention:  Client legally changed her name and gender marker and reviewed the next steps regarding this change. She took the session to explored body image experiences and she shared what she achieved so far and explored alternatives.    Assignment:  - Using hydrotherapy for improving body awareness, focus, relaxation and sensory (new assignment).     Interactive Complexity:  Communication difficulties present during current the psychiatric procedure include:  1. None.     Diagnosis:  302.85 (F64.0) Gender Dysphoria in Adolescents and Adults     Plan / Need for Future Services:  Return for therapy in two (2) weeks.      Kenia Webb, PhD, Postdoctoral Fellow

## 2020-12-08 ENCOUNTER — DOCUMENTATION ONLY (OUTPATIENT)
Dept: FAMILY MEDICINE | Facility: CLINIC | Age: 40
End: 2020-12-08

## 2020-12-08 RX ORDER — ESTRADIOL 0.1 MG/D
2 FILM, EXTENDED RELEASE TRANSDERMAL
Qty: 16 PATCH | Refills: 3 | Status: SHIPPED | OUTPATIENT
Start: 2020-12-10 | End: 2021-03-11

## 2020-12-08 RX ORDER — SPIRONOLACTONE 100 MG/1
100 TABLET, FILM COATED ORAL DAILY
Qty: 30 TABLET | Refills: 3 | Status: SHIPPED | OUTPATIENT
Start: 2020-12-08 | End: 2021-05-17

## 2020-12-08 RX ORDER — FINASTERIDE 5 MG/1
5 TABLET, FILM COATED ORAL DAILY
Qty: 30 TABLET | Refills: 3 | Status: SHIPPED | OUTPATIENT
Start: 2020-12-08 | End: 2021-03-26

## 2020-12-14 ENCOUNTER — OFFICE VISIT (OUTPATIENT)
Dept: DERMATOLOGY | Facility: CLINIC | Age: 40
End: 2020-12-14
Payer: COMMERCIAL

## 2020-12-14 DIAGNOSIS — F64.0 GENDER DYSPHORIA IN ADOLESCENT AND ADULT: ICD-10-CM

## 2020-12-14 DIAGNOSIS — D48.5 NEOPLASM OF UNCERTAIN BEHAVIOR OF SKIN: Primary | ICD-10-CM

## 2020-12-14 LAB
ALBUMIN SERPL-MCNC: 4 G/DL (ref 3.4–5)
ALP SERPL-CCNC: 55 U/L (ref 40–150)
ALT SERPL W P-5'-P-CCNC: 31 U/L (ref 0–50)
ANION GAP SERPL CALCULATED.3IONS-SCNC: 3 MMOL/L (ref 3–14)
AST SERPL W P-5'-P-CCNC: 19 U/L (ref 0–45)
BILIRUB SERPL-MCNC: 0.6 MG/DL (ref 0.2–1.3)
BUN SERPL-MCNC: 15 MG/DL (ref 7–30)
CALCIUM SERPL-MCNC: 9.1 MG/DL (ref 8.5–10.1)
CHLORIDE SERPL-SCNC: 106 MMOL/L (ref 94–109)
CHOLEST SERPL-MCNC: 164 MG/DL
CO2 SERPL-SCNC: 29 MMOL/L (ref 20–32)
CREAT SERPL-MCNC: 0.84 MG/DL (ref 0.52–1.04)
GFR SERPL CREATININE-BSD FRML MDRD: 85 ML/MIN/{1.73_M2}
GLUCOSE SERPL-MCNC: 84 MG/DL (ref 70–99)
HDLC SERPL-MCNC: 73 MG/DL
LDLC SERPL CALC-MCNC: 81 MG/DL
NONHDLC SERPL-MCNC: 91 MG/DL
POTASSIUM SERPL-SCNC: 4.4 MMOL/L (ref 3.4–5.3)
PROT SERPL-MCNC: 7.2 G/DL (ref 6.8–8.8)
SODIUM SERPL-SCNC: 139 MMOL/L (ref 133–144)
TRIGL SERPL-MCNC: 51 MG/DL

## 2020-12-14 PROCEDURE — 88342 IMHCHEM/IMCYTCHM 1ST ANTB: CPT | Performed by: PATHOLOGY

## 2020-12-14 PROCEDURE — 11104 PUNCH BX SKIN SINGLE LESION: CPT | Mod: GC | Performed by: DERMATOLOGY

## 2020-12-14 PROCEDURE — 88305 TISSUE EXAM BY PATHOLOGIST: CPT | Performed by: PATHOLOGY

## 2020-12-14 PROCEDURE — 99202 OFFICE O/P NEW SF 15 MIN: CPT | Mod: 25 | Performed by: DERMATOLOGY

## 2020-12-14 RX ORDER — LIDOCAINE HYDROCHLORIDE AND EPINEPHRINE 10; 10 MG/ML; UG/ML
3 INJECTION, SOLUTION INFILTRATION; PERINEURAL ONCE
Status: ACTIVE | OUTPATIENT
Start: 2020-12-14

## 2020-12-14 ASSESSMENT — PAIN SCALES - GENERAL: PAINLEVEL: NO PAIN (0)

## 2020-12-14 NOTE — PATIENT INSTRUCTIONS
Wound Care After a Biopsy    What is a skin biopsy?  A skin biopsy allows the doctor to examine a very small piece of tissue under the microscope to determine the diagnosis and the best treatment for the skin condition. A local anesthetic (numbing medicine)  is injected with a very small needle into the skin area to be tested. A small piece of skin is taken from the area. Sometimes a suture (stitch) is used.     What are the risks of a skin biopsy?  I will experience scar, bleeding, swelling, pain, crusting and redness. I may experience incomplete removal or recurrence. Risks of this procedure are excessive bleeding, bruising, infection, nerve damage, numbness, thick (hypertrophic or keloidal) scar and non-diagnostic biopsy.    How should I care for my wound for the first 24 hours?    Keep the wound dry and covered for 24 hours    If it bleeds, hold direct pressure on the area for 15 minutes. If bleeding does not stop then go to the emergency room    Avoid strenuous exercise the first 1-2 days or as your doctor instructs you    How should I care for the wound after 24 hours?    After 24 hours, remove the bandage    You may bathe or shower as normal    If you had a scalp biopsy, you can shampoo as usual and can use shower water to clean the biopsy site daily    Clean the wound twice a day with gentle soap and water    Do not scrub, be gentle    Apply white petroleum/Vaseline after cleaning the wound with a cotton swab or a clean finger, and keep the site covered with a Bandaid /bandage. Bandages are not necessary with a scalp biopsy    If you are unable to cover the site with a Bandaid /bandage, re-apply ointment 2-3 times a day to keep the site moist. Moisture will help with healing    Avoid strenuous activity for first 1-2 days    Avoid lakes, rivers, pools, and oceans until the stitches are removed or the site is healed    How do I clean my wound?    Wash hands thoroughly with soap or use hand  before all  wound care    Clean the wound with gentle soap and water    Apply white petroleum/Vaseline  to wound after it is clean    Replace the Bandaid /bandage to keep the wound covered for the first few days or as instructed by your doctor    If you had a scalp biopsy, warm shower water to the area on a daily basis should suffice    What should I use to clean my wound?     Cotton-tipped applicators (Qtips )    White petroleum jelly (Vaseline ). Use a clean new container and use Q-tips to apply.    Bandaids   as needed    Gentle soap     How should I care for my wound long term?    Do not get your wound dirty    Keep up with wound care for one week or until the area is healed.    A small scab will form and fall off by itself when the area is completely healed. The area will be red and will become pink in color as it heals. Sun protection is very important for how your scar will turn out. Sunscreen with an SPF 30 or greater is recommended once the area is healed.    If you have stitches, stitches need to be removed in 7-10 days. You may return to our clinic for this or you may have it done locally at your doctor s office.    You should have some soreness but it should be mild and slowly go away over several days. Talk to your doctor about using tylenol for pain,    When should I call my doctor?  If you have increased:     Pain or swelling    Pus or drainage (clear or slightly yellow drainage is ok)    Temperature over 100F    Spreading redness or warmth around wound    When will I hear about my results?  The biopsy results can take 2-3 weeks to come back. The clinic will call you with the results, send you a [facility] message, or have you schedule a follow-up clinic or phone time to discuss the results. Contact our clinics if you do not hear from us in 3 weeks.     Who should I call with questions?    [facility]: [phone]     [facility]: [phone]    For  urgent needs outside of business hours call the [facility] at [phone] and ask for the dermatology resident on call      Patient Education     Checking for Skin Cancer  You can find cancer early by checking your skin each month. There are 3 kinds of skin cancer. They are melanoma, basal cell carcinoma, and squamous cell carcinoma. Doing monthly skin checks is the best way to find new marks or skin changes. Follow the instructions below for checking your skin.   The ABCDEs of checking moles for melanoma   Check your moles or growths for signs of melanoma using ABCDE:     Asymmetry: the sides of the mole or growth don t match    Border: the edges are ragged, notched, or blurred    Color: the color within the mole or growth varies    Diameter: the mole or growth is larger than 6 mm (size of a pencil eraser)    Evolving: the size, shape, or color of the mole or growth is changing (evolving is not shown in the images below)    Checking for other types of skin cancer  Basal cell carcinoma or squamous cell carcinoma have symptoms such as:       A spot or mole that looks different from all other marks on your skin    Changes in how an area feels, such as itching, tenderness, or pain    Changes in the skin's surface, such as oozing, bleeding, or scaliness    A sore that does not heal    New swelling or redness beyond the border of a mole    Who s at risk?  Anyone can get skin cancer. But you are at greater risk if you have:     Fair skin, light-colored hair, or light-colored eyes    Many moles or abnormal moles on your skin    A history of sunburns from sunlight or tanning beds    A family history of skin cancer    A history of exposure to radiation or chemicals    A weakened immune system  If you have had skin cancer in the past, you are at risk for recurring skin cancer.   How to check your skin  Do your monthly skin checkups in front of a full-length mirror. Check all parts of your body, including your:     Head  (ears, face, neck, and scalp)    Torso (front, back, and sides)    Arms (tops, undersides, upper, and lower armpits)    Hands (palms, backs, and fingers, including under the nails)    Buttocks and genitals    Legs (front, back, and sides)    Feet (tops, soles, toes, including under the nails, and between toes)  If you have a lot of moles, take digital photos of them each month. Make sure to take photos both up close and from a distance. These can help you see if any moles change over time.   Most skin changes are not cancer. But if you see any changes in your skin, call your doctor right away. Only he or she can diagnose a problem. If you have skin cancer, seeing your doctor can be the first step toward getting the treatment that could save your life.   [employer] last reviewed this educational content on 4/1/2019 2000-2020 [employer]. [address] [place] [address], [place] [address]. All rights reserved. This information is not intended as a substitute for professional medical care. Always follow your healthcare professional's instructions.

## 2020-12-14 NOTE — PROGRESS NOTES
"[facility] Dermatology Note    Dermatology Problem List:    0. NUB  - R areola, s/p punch bx 12/14/20  1. H/o dysplastic nevi  - moderate, right upper arm, s/p shave bx (outside provider)    Encounter Date: Dec 14, 2020    CC:   \"I have this mole on my right breast area\"    History of Present Illness:  Ms. Candida Francois is a 40 year old adult with past dermatologic history listed above who presents as a referral from Dr. Rodriguez for further evaluation of a spot on her right breast area. Been present for about 2-3 months. Reports that it's much darker than the rest of her moles. Denies any pain, pruritus, and bleeding. Began noticing it with her oral estrogen hormone therapy.     Of note, denies any personal history of skin cancer. Had a mole taken off her right foot which was an acral nevus and one off her right arm (moderate dysplastic). Grew up in [place]. Denies any tanning bed use and immunosuppression    Past Medical History:   Patient Active Problem List   Diagnosis     Gender dysphoria in adolescent and adult     No past medical history on file.  Past Surgical History:   Procedure Laterality Date     HC TOOTH EXTRACTION W/FORCEP         Social History:  Patient  reports that she has never smoked. She has never used smokeless tobacco. She reports previous alcohol use. She reports that she does not use drugs.    Family History:  Family History   Adopted: Yes       Medications:  Current Outpatient Medications   Medication Sig Dispense Refill     BIOTIN PO Take 5,000 mcg by mouth        cetirizine (ZYRTEC) 10 MG tablet Take 10 mg by mouth daily       Shae, Zingiber officinalis, (SHAE ROOT) 550 MG CAPS capsule Take 1,100 mg by mouth daily       Hyaluronic Acid-Vitamin C (HYALURONIC ACID PO)        MAGNESIUM GLYCINATE PO Take 400 mg by mouth daily       multivitamin w/minerals (MULTI-VITAMIN) tablet Take 1 tablet by mouth daily       Omega-3 Fatty Acids (FISH OIL PO) Take 1,400 mg by " mouth daily        spironolactone (ALDACTONE) 100 MG tablet Take 1 tablet (100 mg) by mouth daily 30 tablet 3     estradiol (VIVELLE-DOT) 0.1 MG/24HR bi-weekly patch Place 2 patches onto the skin twice a week 16 patch 3     finasteride (PROSCAR) 5 MG tablet Take 1 tablet (5 mg) by mouth daily 30 tablet 3        No Known Allergies    Review of Systems:  - Skin/Heme New Pt: The patient admits to infrequent sun exposure. The patient denies excessive scarring or problems healing except as per HPI. The patient denies excessive bleeding.  - Constitutional: Otherwise feeling well today, in usual state of health.  - Skin: As above in HPI. No additional skin concerns.    Physical exam:  Vitals: There were no vitals taken for this visit.  GEN: This is a well developed, well-nourished female in no acute distress, in a pleasant mood.    SKIN: Focused examination of the chest was performed.  - 3x2mm dark brown/black papule with thick/dark pigment network on the right breast  - No other lesions of concern on areas examined.         In office labs or procedures performed today:   punch bx (x1)    Impression/Plan:  1. Neoplasm of uncertain behavior on the right breast. The differential diagnosis includes r/o melanoma.   - Punch biopsy:  After discussion of benefits and risks including but not limited to bleeding/bruising, pain/swelling, infection, scar, incomplete removal, nerve damage/numbness, recurrence, and non-diagnostic biopsy, written consent, verbal consent and photographs were obtained. Time-out was performed. The area was cleaned with isopropyl alcohol. 0.5mL of 1% lidocaine with 1:100,000 epinephrine was injected to obtain adequate anesthesia of the lesion on the right breast.  A 4 mm punch biopsy was performed. 4-0 prolene sutures were utilized to approximate the epidermal edges. White petroleum jelly/Vaseline and a bandage was applied to the wound. Explicit verbal and written wound care instructions were provided. The  patient left the Dermatology Clinic in good condition. The patient was counseled to follow up for suture removal in approximately 7-10 days.  - ABCDs of melanoma were discussed and self skin checks were advised. Sun precaution was advised including the use of sun screens of SPF 30 or higher, sun protective clothing, and avoidance of tanning beds.  - Will call patient with biopsy results when available  - Photodocumentation obtained in clinic    Follow-up in 1 year, earlier for new or changing lesions.     Staff Involved:  Patient was seen and staffed with attending physician Dr. Shandra Benedict MD  Med/Derm Resident PGY-3  P:[phone]    Staff Addendum:  I was present for key portions of the procedure. Kelsey Devi MD  I, Kelsey Devi MD, saw this patient with the resident and agree with the resident s findings and plan of care as documented in the resident s note.

## 2020-12-14 NOTE — NURSING NOTE
"Chief Complaint   Patient presents with     Derm Problem     Candida is here today for a spot on her breast. She states \" I have a black spot on my right breast that concerns me today\"     Evelyn Sims, RMCHIP  "

## 2020-12-14 NOTE — LETTER
"12/14/2020       RE: Candida Francois  [address] [address] [facility] [address]     Dear Colleague,    Thank you for referring your patient, Candida Francois, to the [facility] D[facility]MATOLOG[facility] [place] at [facility]. Please see a copy of my visit note below.    [facility] Dermatology Note    Dermatology Problem List:    0. NUB  - R areola, s/p punch bx 12/14/20  1. H/o dysplastic nevi  - moderate, right upper arm, s/p shave bx (outside provider)    Encounter Date: Dec 14, 2020    CC:   \"I have this mole on my right breast area\"    History of Present Illness:  Ms. Candida Francois is a 40 year old adult with past dermatologic history listed above who presents as a referral from Dr. Rodriguez for further evaluation of a spot on her right breast area. Been present for about 2-3 months. Reports that it's much darker than the rest of her moles. Denies any pain, pruritus, and bleeding. Began noticing it with her oral estrogen hormone therapy.     Of note, denies any personal history of skin cancer. Had a mole taken off her right foot which was an acral nevus and one off her right arm (moderate dysplastic). Grew up in [place]. Denies any tanning bed use and immunosuppression    Past Medical History:   Patient Active Problem List   Diagnosis     Gender dysphoria in adolescent and adult     No past medical history on file.  Past Surgical History:   Procedure Laterality Date     HC TOOTH EXTRACTION W/FORCEP         Social History:  Patient  reports that she has never smoked. She has never used smokeless tobacco. She reports previous alcohol use. She reports that she does not use drugs.    Family History:  Family History   Adopted: Yes       Medications:  Current Outpatient Medications   Medication Sig Dispense Refill     BIOTIN PO Take 5,000 mcg by mouth        cetirizine (ZYRTEC) 10 MG tablet Take 10 mg by mouth daily       Shae, Zingiber " officinalis, (MILADIS ROOT) 550 MG CAPS capsule Take 1,100 mg by mouth daily       Hyaluronic Acid-Vitamin C (HYALURONIC ACID PO)        MAGNESIUM GLYCINATE PO Take 400 mg by mouth daily       multivitamin w/minerals (MULTI-VITAMIN) tablet Take 1 tablet by mouth daily       Omega-3 Fatty Acids (FISH OIL PO) Take 1,400 mg by mouth daily        spironolactone (ALDACTONE) 100 MG tablet Take 1 tablet (100 mg) by mouth daily 30 tablet 3     estradiol (VIVELLE-DOT) 0.1 MG/24HR bi-weekly patch Place 2 patches onto the skin twice a week 16 patch 3     finasteride (PROSCAR) 5 MG tablet Take 1 tablet (5 mg) by mouth daily 30 tablet 3        No Known Allergies    Review of Systems:  - Skin/Heme New Pt: The patient admits to infrequent sun exposure. The patient denies excessive scarring or problems healing except as per HPI. The patient denies excessive bleeding.  - Constitutional: Otherwise feeling well today, in usual state of health.  - Skin: As above in HPI. No additional skin concerns.    Physical exam:  Vitals: There were no vitals taken for this visit.  GEN: This is a well developed, well-nourished female in no acute distress, in a pleasant mood.    SKIN: Focused examination of the chest was performed.  - 3x2mm dark brown/black papule with thick/dark pigment network on the right breast  - No other lesions of concern on areas examined.         In office labs or procedures performed today:   punch bx (x1)    Impression/Plan:  1. Neoplasm of uncertain behavior on the right breast. The differential diagnosis includes r/o melanoma.   - Punch biopsy:  After discussion of benefits and risks including but not limited to bleeding/bruising, pain/swelling, infection, scar, incomplete removal, nerve damage/numbness, recurrence, and non-diagnostic biopsy, written consent, verbal consent and photographs were obtained. Time-out was performed. The area was cleaned with isopropyl alcohol. 0.5mL of 1% lidocaine with 1:100,000 epinephrine  was injected to obtain adequate anesthesia of the lesion on the right breast.  A 4 mm punch biopsy was performed. 4-0 prolene sutures were utilized to approximate the epidermal edges. White petroleum jelly/Vaseline and a bandage was applied to the wound. Explicit verbal and written wound care instructions were provided. The patient left the Dermatology Clinic in good condition. The patient was counseled to follow up for suture removal in approximately 7-10 days.  - ABCDs of melanoma were discussed and self skin checks were advised. Sun precaution was advised including the use of sun screens of SPF 30 or higher, sun protective clothing, and avoidance of tanning beds.  - Will call patient with biopsy results when available  - Photodocumentation obtained in clinic    Follow-up in 1 year, earlier for new or changing lesions.     Staff Involved:  Patient was seen and staffed with attending physician Dr. Shandra Benedict MD  Med/Derm Resident PGY-3  P:[phone]    Staff Addendum:  I was present for key portions of the procedure. Kelsey CARDONA I, Kelsey Devi MD, saw this patient with the resident and agree with the resident s findings and plan of care as documented in the resident s note.

## 2020-12-16 LAB — COPATH REPORT: NORMAL

## 2020-12-21 ENCOUNTER — VIRTUAL VISIT (OUTPATIENT)
Dept: PSYCHOLOGY | Facility: CLINIC | Age: 40
End: 2020-12-21
Payer: COMMERCIAL

## 2020-12-21 DIAGNOSIS — F64.0 GENDER DYSPHORIA IN ADOLESCENT AND ADULT: Primary | ICD-10-CM

## 2020-12-21 PROCEDURE — 99207 PR NO BILLABLE SERVICE THIS VISIT: CPT | Performed by: STUDENT IN AN ORGANIZED HEALTH CARE EDUCATION/TRAINING PROGRAM

## 2020-12-21 PROCEDURE — 90837 PSYTX W PT 60 MINUTES: CPT | Mod: U7 | Performed by: STUDENT IN AN ORGANIZED HEALTH CARE EDUCATION/TRAINING PROGRAM

## 2020-12-21 NOTE — RESULT ENCOUNTER NOTE
Dear Candida,     Here are your recent results which are within the expected normal range. Please continue with your current plan of care and let us know if you have any questions or concerns.    Regards,  Los Beck MD

## 2020-12-22 NOTE — PROGRESS NOTES
Telemedicine Visit: The client's condition can be safely assessed and treated via synchronous audio and visual telemedicine encounter.    Reason for Telemedicine Visit: COVID-19 restrictions.  Originating Site (Client Location): Client's home  Distant Site (Provider Location): Provider Remote Setting  Consent: The client/guardian has verbally consented to: the potential risks and benefits of telemedicine (video visit) versus in person care; bill my insurance or make self-payment for services provided; and responsibility for payment of non-covered services.   Mode of Communication: Video Conference via Zoom platform because platform [employer] did not work. Client indicated verbal consent about the potential risks in not using [employer]. A secure password link for the session was generated by the [email or web address] account     As the provider I attest to compliance with applicable laws and regulations related to telemedicine.  Video start time: 9:00am  Video end time: 9:53am      [facility] -  Case Progress Note     Date of Service: 12/21/20  Client Name: Candida Francois (pronouns: she/her/her)  YOB: 1980  Age: 40 year old  MRN: [id number]  Gender/Gender Identity: Female  Treating Provider: Kenia Webb, PhD, Postdoctoral Fellow  Type of Session: Individual  Present in Session: Client only  Number of Minutes: 53 min     Current Symptoms/Status:     Gender Dysphoria: Gender and body dysphoria associated with fear and anxiety. Incongruence and distress between her biological sex and her experienced/expressed gender. Recurrent insecurities regarding her gender needs. Body and facial hair have been trigger her gender dysphoria recurrent since she has been presenting herself as woman at work. Her gender dysphoria regarding her voice has been a recurrent issue. Minority stress was reported.     Progress Toward Treatment Goals:   Session # 14 - Client continues to  show interest and commitment towards receiving services at [facility].  Treatment plan established in 7/24/2020.  Continued working on her assignments.  Consistent progress in affirming her gender at work.  Client legally changed the name and gender marker (12/03/2020).  Started hair removal and voice therapy sessions.    Intervention: Modality and Description:   Individual, interpersonal, CBT. The focus of today's session was on exploring the increase and intensity of anxiety symptoms.    Response to Intervention:  In the last two weeks, client has noticed a persistence of anxiety states and she took the session to talk about it. Possible factors were explored with the client. Despite of her discomfort, client was able to cope positively. The session helped the client to realize that similar anxiety peaks has been occurred previously but were not noticed by her. Tracking strategies were explored and suggested.     Assignment:  - Using hydrotherapy for improving body awareness, focus, relaxation and sensory (progressing assignment).     Interactive Complexity:  Communication difficulties present during current the psychiatric procedure include:  1. None.     Diagnosis:  302.85 (F64.0) Gender Dysphoria in Adolescents and Adults     Plan / Need for Future Services:  Return for therapy in two (2) weeks.      Kenia Webb, PhD, Postdoctoral Fellow

## 2021-01-04 ENCOUNTER — VIRTUAL VISIT (OUTPATIENT)
Dept: PSYCHOLOGY | Facility: CLINIC | Age: 41
End: 2021-01-04
Payer: COMMERCIAL

## 2021-01-04 DIAGNOSIS — F64.0 GENDER DYSPHORIA IN ADOLESCENT AND ADULT: Primary | ICD-10-CM

## 2021-01-04 PROCEDURE — 90832 PSYTX W PT 30 MINUTES: CPT | Mod: U7 | Performed by: STUDENT IN AN ORGANIZED HEALTH CARE EDUCATION/TRAINING PROGRAM

## 2021-01-04 PROCEDURE — 99207 PR NO BILLABLE SERVICE THIS VISIT: CPT | Performed by: STUDENT IN AN ORGANIZED HEALTH CARE EDUCATION/TRAINING PROGRAM

## 2021-01-05 NOTE — PROGRESS NOTES
Telemedicine Visit: The client's condition can be safely assessed and treated via synchronous audio and visual telemedicine encounter.    Reason for Telemedicine Visit: COVID-19 restrictions.  Originating Site (Client Location): Client's home  Distant Site (Provider Location): Provider Remote Setting  Consent: The client/guardian has verbally consented to: the potential risks and benefits of telemedicine (video visit) versus in person care; bill my insurance or make self-payment for services provided; and responsibility for payment of non-covered services.   Mode of Communication: Video Conference via Zoom platform because platform Kimble did not work. Client indicated verbal consent about the potential risks in not using [employer]. A secure password link for the session was generated by the [email or web address] account     As the provider I attest to compliance with applicable laws and regulations related to telemedicine.  Video start time: 9:05am  Video end time: 9:35am      [facility] -  Case Progress Note     Date of Service: 1/04/21  Client Name: Candida Francois (pronouns: she/her/her)  YOB: 1980  Age: 40 year old  MRN: [id number]  Gender/Gender Identity: Female  Treating Provider: Kenia Webb, PhD, Postdoctoral Fellow  Type of Session: Individual  Present in Session: Client only  Number of Minutes: 30 min     Current Symptoms/Status:     Gender Dysphoria: Gender and body dysphoria associated with fear and anxiety. Incongruence and distress between her biological sex and her experienced/expressed gender. Recurrent insecurities regarding her gender needs. Body and facial hair have been trigger her gender dysphoria recurrent since she has been presenting herself as woman at work. Her gender dysphoria regarding her voice has been a recurrent issue. Minority stress has been reported.     Progress Toward Treatment Goals:   Session # 15 - Client continues  to show interest and commitment towards receiving services at [facility].  Treatment plan established in 7/24/2020.  Continued working on her assignments.  Consistent progress in affirming her gender at work.  Client legally changed the name and gender marker (12/03/2020).  Started hair removal and voice therapy sessions.    Intervention: Modality and Description:   Individual, interpersonal, CBT. The focus of today's session was on sharing the recent experience at her mother's house.    Response to Intervention:  Client reported satisfaction with the non-growth/less facial hair. She mentioned that has been helping her to cope with her gender dysphoria. For voice therapy, she mentioned that still very early to see results. Client also vented her frustrations of the name change process in all her documentation. The recent visit to her mother's house brought feelings of loneliness and anxiety symptoms observed by bodily sensations and recurring anticipatory thoughts. Client felt very good at her mother's side and she has regretted not having the same emotional support and social interaction in the city where she lives. Client felt comfortable when validating her feelings during the session. Future plans were discussed.    Assignment:  - Using hydrotherapy for improving body awareness, focus, relaxation and sensory (progressing assignment).     Interactive Complexity:  Communication difficulties present during current the psychiatric procedure include:  1. None.     Diagnosis:  302.85 (F64.0) Gender Dysphoria in Adolescents and Adults     Plan / Need for Future Services:  Return for therapy in two (2) weeks.      Kenia Webb, PhD, Postdoctoral Fellow

## 2021-01-06 ENCOUNTER — OFFICE VISIT (OUTPATIENT)
Dept: OPHTHALMOLOGY | Facility: CLINIC | Age: 41
End: 2021-01-06
Payer: COMMERCIAL

## 2021-01-06 DIAGNOSIS — H00.12 CHALAZION OF RIGHT LOWER EYELID: Primary | ICD-10-CM

## 2021-01-06 PROCEDURE — 67800 REMOVE EYELID LESION: CPT | Mod: E4 | Performed by: OPHTHALMOLOGY

## 2021-01-06 PROCEDURE — 99203 OFFICE O/P NEW LOW 30 MIN: CPT | Mod: 25 | Performed by: OPHTHALMOLOGY

## 2021-01-06 RX ORDER — NEOMYCIN SULFATE, POLYMYXIN B SULFATE, AND DEXAMETHASONE 3.5; 10000; 1 MG/G; [USP'U]/G; MG/G
OINTMENT OPHTHALMIC
Qty: 1 TUBE | Refills: 0 | Status: SHIPPED | OUTPATIENT
Start: 2021-01-06 | End: 2021-01-25

## 2021-01-06 RX ORDER — LIDOCAINE/PRILOCAINE 2.5 %-2.5%
1 CREAM (GRAM) TOPICAL PRN
COMMUNITY
Start: 2020-12-21

## 2021-01-06 ASSESSMENT — CONF VISUAL FIELD
OD_NORMAL: 1
OS_NORMAL: 1
METHOD: COUNTING FINGERS

## 2021-01-06 ASSESSMENT — VISUAL ACUITY
OS_SC+: -1
OD_SC: 20/15
OD_SC+: -1
METHOD: SNELLEN - LINEAR
OS_SC: 20/30

## 2021-01-06 ASSESSMENT — SLIT LAMP EXAM - LIDS: COMMENTS: NORMAL

## 2021-01-06 ASSESSMENT — TONOMETRY
IOP_METHOD: ICARE
OS_IOP_MMHG: 10
OD_IOP_MMHG: 11

## 2021-01-06 NOTE — PROGRESS NOTES
Chief Complaint(s) and History of Present Illness(es)     Chalazion Evaluation     Laterality: right lower lid              Comments     Patient referred by Dr. Rodriguez for chalazion consultation. Patient   presents with bump on right lower lid x 3-4 months. Has tried warm   compresses. Bump fluctuates in size.  Constant itch.         Assessment & Plan     Candida Francois is a 40 year old adult with the following diagnoses:   Encounter Diagnosis   Name Primary?     Chalazion of right lower eyelid Yes     Discussed options.   Plan Right lower lid chalazion incision and drainage.     Attending Physician Attestation: Complete documentation of historical and exam elements from today's encounter can be found in the full encounter summary report (not reduplicated in this progress note). I personally obtained the chief complaint(s) and history of present illness. I confirmed and edited as necessary the review of systems, past medical/surgical history, family history, social history, and examination findings as documented by others; and I examined the patient myself. I personally reviewed the relevant tests, images, and reports as documented above. I formulated and edited as necessary the assessment and plan and discussed the findings and management plan with the patient.  -Rona Carlson MD    OPERATIVE NOTE: CHALAZION.    PRE-OPERATIVE DIAGNOSIS: Chalazion right lower lid.    POST-OPERATIVE DIAGNOSIS: Chalazion right lower lid.    PROCEDURE PERFORMED: Incision and drainage of chalazion right lower lid.    SURGEON: Rona Carlson    ASSISTANT: None    ANESTHESIA: Local infiltration with 2% lidocaine with epinephrine.    COMPLICATIONS: None    ESTIMATED BLOOD LOSS:  <5mL    HISTORY AND INDICATIONS: Patient presented with an enlarging chalazion in the involved eyelid.  This failed conservative medical management.  After the risks, benefits and alternatives of the proposed procedure were explained, informed  consent was obtained.     PROCEDURE: Patient was brought to the minor procedure room and placed supine on the operating table.  Anesthesia was as listed above.  The area was prepped and draped in the typical fashion.  A chalazion clamp was placed over the involved eyelid and the eyelid everted.  A crutiate incision was made over the chalazion and the lipogranulomatous material removed using the chalazion curette.  Scissors were used to break any septae.  The edges of the cruciate incision were excised using Denys scissors.  Hemostasis was obtained.  The lid was reverted to its normal position, the clamp removed, and the erythromycin antibiotic ointment applied.  The patient tolerated the procedure well and left in stable condition with a prescription for a tube of antibiotic ointment.     I was present for the entire procedure. Rona Carlson MD

## 2021-01-06 NOTE — LETTER
1/6/2021         RE: Candida Francois  [address] [address] [address]        Dear Colleague,    Thank you for referring your patient, Candida Francois, to the [facility]. Please see a copy of my visit note below.         Chief Complaint(s) and History of Present Illness(es)     Chalazion Evaluation     Laterality: right lower lid              Comments     Patient referred by Dr. Rodriguez for chalazion consultation. Patient   presents with bump on right lower lid x 3-4 months. Has tried warm   compresses. Bump fluctuates in size.  Constant itch.         Assessment & Plan     Candida Francois is a 40 year old adult with the following diagnoses:   Encounter Diagnosis   Name Primary?     Chalazion of right lower eyelid Yes     Discussed options.   Plan Right lower lid chalazion incision and drainage.     Attending Physician Attestation: Complete documentation of historical and exam elements from today's encounter can be found in the full encounter summary report (not reduplicated in this progress note). I personally obtained the chief complaint(s) and history of present illness. I confirmed and edited as necessary the review of systems, past medical/surgical history, family history, social history, and examination findings as documented by others; and I examined the patient myself. I personally reviewed the relevant tests, images, and reports as documented above. I formulated and edited as necessary the assessment and plan and discussed the findings and management plan with the patient.  -Rona Carlson MD    OPERATIVE NOTE: CHALAZION.    PRE-OPERATIVE DIAGNOSIS: Chalazion right lower lid.    POST-OPERATIVE DIAGNOSIS: Chalazion right lower lid.    PROCEDURE PERFORMED: Incision and drainage of chalazion right lower lid.    SURGEON: Rona Carlson    ASSISTANT: None    ANESTHESIA: Local infiltration with 2% lidocaine with epinephrine.    COMPLICATIONS:  None    ESTIMATED BLOOD LOSS:  <5mL    HISTORY AND INDICATIONS: Patient presented with an enlarging chalazion in the involved eyelid.  This failed conservative medical management.  After the risks, benefits and alternatives of the proposed procedure were explained, informed consent was obtained.     PROCEDURE: Patient was brought to the minor procedure room and placed supine on the operating table.  Anesthesia was as listed above.  The area was prepped and draped in the typical fashion.  A chalazion clamp was placed over the involved eyelid and the eyelid everted.  A crutiate incision was made over the chalazion and the lipogranulomatous material removed using the chalazion curette.  Scissors were used to break any septae.  The edges of the cruciate incision were excised using Denys scissors.  Hemostasis was obtained.  The lid was reverted to its normal position, the clamp removed, and the erythromycin antibiotic ointment applied.  The patient tolerated the procedure well and left in stable condition with a prescription for a tube of antibiotic ointment.     I was present for the entire procedure. Rona Carlson MD         Again, thank you for allowing me to participate in the care of your patient.        Sincerely,        Rona Carlson MD

## 2021-01-06 NOTE — PATIENT INSTRUCTIONS
Use cold compresses for the first 48 hours to minimize bruising and swelling. It works well to put a washcloth in a bowl of ice water and use the cold washcloth.     After 48 hours switch back to using a hot washcloth to clean around your eyelids in the morning and in the evening to prevent new styes from forming.    Apply the prescribed/provided ointment into the eye three times daily for five days. It will make your vision a bit blurry.

## 2021-01-06 NOTE — NURSING NOTE
Chief Complaints and History of Present Illnesses   Patient presents with     Chalazion Evaluation       Chief Complaint(s) and History of Present Illness(es)     Chalazion Evaluation     Laterality: right lower lid              Comments     Patient referred by Dr. Rodriguez for chalazion consultation. Patient presents with bump on right lower lid x 3-4 months. Has tried warm compresses. Bump fluctuates in size.                 [place], COA

## 2021-01-18 ENCOUNTER — VIRTUAL VISIT (OUTPATIENT)
Dept: FAMILY MEDICINE | Facility: CLINIC | Age: 41
End: 2021-01-18
Payer: COMMERCIAL

## 2021-01-18 DIAGNOSIS — Z71.3 DIETARY COUNSELING AND SURVEILLANCE: Primary | ICD-10-CM

## 2021-01-18 NOTE — PROGRESS NOTES
"[facility] Nutrition Assessment    aCndida Francois is a 40 year old adult who presents for nutrition counseling for weight management. A few year ago made some life changes (quite smoking 4y, quit vaping 3y) and last year hit highest weight at 240 lb. Felt \"like a balloon\". Started looking into diet and make healthier changes, used like more junk foods. Leaned towards vegetarian based eating and started with a . Maintained a 600 kcal deficiency per day (eating 1200 kcal/day). Went from 240 lb to 170 lb in 9 months. Last year also moved forward with gender transition, and in Aug/Sept 2020 her weight changes slowed with hormone replacement. Was very hungry, snacking more.  Noted she was gaining weight but not inches. Ate poorly over [place], didn't count calories and gained 10 lb. Would like to lose weight (to waist, legs if possible) and fuel additional body changes. Does not feel low calorie is sustainable any longer.    Recent diet recall:  Previously on Healthy for Life Meal Plan 1200 kcal/day  About to start 1500 kcal/day vegetarian plan, with 2 steaks week (Thurs/Sun)  Prev: eggs, hash browns, fruit, oatmeal  Breakfast: 3/4 nonfat greek yogurt, 1/3 c granola, 1.5 c cubed cantaloupe, handful almonds, cup coffee  Salads for lunch, or turkey meal  Turkey frittata over brown rice with steamed vegetables  No snacks - loves dill pickles, green olives, natural walnuts and almonds, chips and salsa, cantaloupe,     Physical activity: Works with   Social: Lives alone; works full time    Recent weights:   Wt Readings from Last 6 Encounters:   10/23/20 83 kg (183 lb)   09/04/20 82.9 kg (182 lb 12 oz)     Current weight from home: 184.2 lb  Estimated body mass index is 24.72 kg/m  as calculated from the following:    Height as of 9/4/20: 1.8 m (5' 10.87\").    Weight as of 11/9/20: 80.1 kg (176 lb 9.6 oz).    Recent lipid values:   Cholesterol   Date Value Ref Range Status   12/14/2020 164 <200 mg/dL " "Final   02/13/2020 167 <200 mg/dL Final     HDL Cholesterol   Date Value Ref Range Status   12/14/2020 73 >49 mg/dL Final   02/13/2020 64 >39 mg/dL Final     LDL Cholesterol Calculated   Date Value Ref Range Status   12/14/2020 81 <100 mg/dL Final     Comment:     Desirable:       <100 mg/dl   02/13/2020 90 <100 mg/dL Final     Comment:     Desirable:       <100 mg/dl     Triglycerides   Date Value Ref Range Status   12/14/2020 51 <150 mg/dL Final   02/13/2020 66 <150 mg/dL Final       Intervention(s)/Plan:  Candida Francois is a 40 year old adult who presents for nutrition counseling for weight management.  1. Agree with liberalizing to 1500 kcal/plan. This will likely be more sustainable and provide calories needed for ongoing transition changes.   2. Discussed balanced snack options. Include protein and fat with carbohydrate.  3.  Will explore need for additional meal planning/meal preparation at next visit. While the meal plan has been working well, Candida may benefit from more autonomy over this process in the future. Historically it has been quite expensive and we would aim to lower the cost of weekly groceries while maintaining a high quality meal plan, while also support goal of weight loss    Follow up:  February 15 at 9:20 am via video    Goal(s):  Prevent further weight gain  Gentle weight loss 0.5-1 lb per week     Patient referred by Ivan Rodriguez MD   Total time spent with patient 49 minutes    Kayleigh Hui RD     Family Medicine Video Visit Note  Candida is being evaluated via a billable video visit.             Video Visit Consent     The patient has been notified of following:     \"This video visit will be conducted via a call between you and your physician/provider. We have found that certain health care needs can be provided without the need for an in-person physical exam.  This service lets us provide the care you need with a video conversation.  If a prescription is necessary " "we can send it directly to your pharmacy.  If lab work is needed we can place an order for that and you can then stop by our lab to have the test done at a later time.    Video visits are billed at different rates depending on your insurance coverage.  Please reach out to your insurance provider with any questions.    If during the course of the call the physician/provider feels a video visit is not appropriate, you will not be charged for this service.\"    Patient has given verbal consent for Video visit? Yes    How would you like to obtain your AVS? Lolay    Patient would like the video invitation sent by: Other e-mail: Lolay    Will anyone else be joining your video visit? No         Video-Visit Details    Type of service:  Video Visit    Video Start Time: 8:40 AM  THIS is the time provider and patient connect.    Video End Time (time video stopped): 9:29 AM    Originating Location (pt. Location): Home    Distant Location (provider location):  [facility]     Mode of Communication:  Video Conference via Mckinley Hui RD                      "

## 2021-02-01 ENCOUNTER — VIRTUAL VISIT (OUTPATIENT)
Dept: PSYCHOLOGY | Facility: CLINIC | Age: 41
End: 2021-02-01
Payer: COMMERCIAL

## 2021-02-01 DIAGNOSIS — F64.0 GENDER DYSPHORIA IN ADOLESCENT AND ADULT: Primary | ICD-10-CM

## 2021-02-01 PROCEDURE — 90837 PSYTX W PT 60 MINUTES: CPT | Mod: U7 | Performed by: STUDENT IN AN ORGANIZED HEALTH CARE EDUCATION/TRAINING PROGRAM

## 2021-02-01 NOTE — PROGRESS NOTES
Telemedicine Visit: The client's condition can be safely assessed and treated via synchronous audio and visual telemedicine encounter.    Reason for Telemedicine Visit: COVID-19 restrictions.  Originating Site (Client Location): Client's home  Distant Site (Provider Location): Provider Remote Setting  Consent: The client/guardian has verbally consented to: the potential risks and benefits of telemedicine (video visit) versus in person care; bill my insurance or make self-payment for services provided; and responsibility for payment of non-covered services.   Mode of Communication: Video Conference via Zoom platform because platform IRI did not work. Client indicated verbal consent about the potential risks in not using [employer]. A secure password link for the session was generated by the [email or web address] account     As the provider I attest to compliance with applicable laws and regulations related to telemedicine.  Video start time: 8:00am  Video end time: 8:53am      [facility] -  Case Progress Note     Date of Service: 2/01/21  Client Name: Candida Francois (pronouns: she/her/her)  YOB: 1980  Age: 40 year old  MRN: [id number]  Gender/Gender Identity: Female  Treating Provider: Kenia Webb, PhD, Postdoctoral Fellow  Type of Session: Individual  Present in Session: Client only  Number of Minutes: 53 min     Current Symptoms/Status:     Gender Dysphoria: Gender and body dysphoria associated with fear and anxiety. Incongruence and distress between her biological sex and her experienced/expressed gender. Recurrent insecurities regarding her gender needs. Body and facial hair have been trigger her gender dysphoria recurrent since she has been presenting herself as woman at work. Her gender dysphoria regarding her voice has been a recurrent issue. Minority stress has been reported.     Progress Toward Treatment Goals:   Session # 16 - Client continues  to show interest and commitment towards receiving services at [facility].  Treatment plan established in 7/24/2020.  Continued working on her assignments.  Consistent progress in affirming her gender at work.  Client legally changed the name and gender marker (12/03/2020).  Started hair removal and voice therapy sessions.    Intervention: Modality and Description:   Individual, interpersonal, CBT. The focus of today's session was on talking about her current situation.    Response to Intervention:  Client reported great satisfaction with hair removal therapy and voice therapy sessions. She made important decisions to increase the effectiveness of the treatment. She has been less anxious after receiving the confirmation of the name change by the N office. She is pursuing name changes in her other documentation. Client has worked to maintain the current friendships and she is also looking for new ones. She has been feeling less lonely and she has been working to better cope with that. Client has shown great commitment to her transition process and it has been important for her to receive appreciation for her efforts.    Assignment:  - Using hydrotherapy for improving body awareness, focus, relaxation and sensory (progressing assignment).     Interactive Complexity:  Communication difficulties present during current the psychiatric procedure include:  1. None.     Diagnosis:  302.85 (F64.0) Gender Dysphoria in Adolescents and Adults     Plan / Need for Future Services:  Return for therapy in two (2) weeks.      Kenia Webb, PhD, Postdoctoral Fellow

## 2021-02-08 ENCOUNTER — VIRTUAL VISIT (OUTPATIENT)
Dept: FAMILY MEDICINE | Facility: CLINIC | Age: 41
End: 2021-02-08
Payer: COMMERCIAL

## 2021-02-08 ENCOUNTER — TELEPHONE (OUTPATIENT)
Dept: FAMILY MEDICINE | Facility: CLINIC | Age: 41
End: 2021-02-08

## 2021-02-08 DIAGNOSIS — F64.0 GENDER DYSPHORIA IN ADOLESCENT AND ADULT: Primary | ICD-10-CM

## 2021-02-08 PROCEDURE — 99214 OFFICE O/P EST MOD 30 MIN: CPT | Mod: 95 | Performed by: FAMILY MEDICINE

## 2021-02-08 RX ORDER — HYDROCODONE BITARTRATE AND ACETAMINOPHEN 5; 325 MG/1; MG/1
TABLET ORAL
Qty: 6 TABLET | Refills: 0 | Status: SHIPPED | OUTPATIENT
Start: 2021-02-08 | End: 2021-02-23

## 2021-02-08 ASSESSMENT — ENCOUNTER SYMPTOMS
SHORTNESS OF BREATH: 0
PSYCHIATRIC NEGATIVE: 1
UNEXPECTED WEIGHT CHANGE: 0
LIGHT-HEADEDNESS: 0
CHILLS: 0
FEVER: 0

## 2021-02-08 NOTE — TELEPHONE ENCOUNTER
"Who is calling?Patient   Medication name:lidocaine (XYLOCAINE  Is this a refill request? No  Have they contacted the pharmacy?  No  Pharmacy  Question/Concern: per patient this is not working for her at, patient woul like a call back from nurse and or Dr. Rodriguez. She stated that its not effective and works like \"water\", does not know what to do now.  Would patient like a call back? Yes     "

## 2021-02-08 NOTE — TELEPHONE ENCOUNTER
Called patient.    Higher strength topical lidocaine 5% did not help with electrolysis pain  Also tried diclofenac 50mg without relief  Thought that lidocaine-prilocaine 2.5-2.5% helped more than the higher dose lidocaine.    Has sessions every 2 weeks for the next 10 months.    Has used hydrocodone in the distant past for this which did help.  Did not make sedated.    I have queried the [place] Prescription Monitoring Program for this patient to determine if they are an appropriate candidate for a controlled substance prescription. There is no evidence of prescription misuse based on this  search.    Given that Candida has tried other reasonable methods of controlling pain and given the very low frequency of use, I think it is reasonable to prescribe a very small amount of opioid pain medications for use only with electrolysis sessions.    Main concern would be sedation effecting driving, which I discussed with Candida.    I've asked her to report back to me on effectiveness of this intervention once she has tried it.    Ivan Rodriguez MD on 2/8/2021 at 4:14 PM

## 2021-02-08 NOTE — PROGRESS NOTES
"The patient has been notified of following:       Patient has given verbal consent for Video visit? Yes    Patient would like the video invitation sent by: Other e-mail: [employer]    Video Start Time: 8:19 AM              DELIA Tirado is a 40 year old individual that uses pronouns She/Her/Hers/Herself that presents today for follow up of:  feminizing hormone therapy.   Alone or accompanied by: accompanied today by  Gender identity: female  Started Hormone  therapy  2020  Continues on Vivelle dot 0.1 x 2 mg patch 2x/wk, spironolactone 100 mg finasteride 5 mg  Any special concerns today?   Doing voice therapy, going well  Doing measurements  Working on weight gain from holidays  Thinking of fat transfer procedure to breasts for increase volume in future  Continues electrolysis    On hormones?  YES +++ Shot day of the week? Not applicable-taking pills/patch/gel      Due for labs?  Yes      +++ Refills of meds needed?  Yes  Gender related body changes since last visit:   Hair regrowth on head sparse-looking into hair transplant  12.25 x 12\" measurment per pt., feels growth continues    Breakthrough bleeding? Does Not Apply    New health concerns since last visit:  ---none    Past Surgical History:   Procedure Laterality Date     HC TOOTH EXTRACTION W/FORCEP         Patient Active Problem List   Diagnosis     Gender dysphoria in adolescent and adult       Current Outpatient Medications   Medication Sig Dispense Refill     BIOTIN PO Take 5,000 mcg by mouth        cetirizine (ZYRTEC) 10 MG tablet Take 10 mg by mouth daily       diclofenac (CATAFLAM) 50 MG tablet Take one tablet by mouth 1 hour before electrolysis procedure. 10 tablet 0     estradiol (VIVELLE-DOT) 0.1 MG/24HR bi-weekly patch Place 2 patches onto the skin twice a week 16 patch 3     finasteride (PROSCAR) 5 MG tablet Take 1 tablet (5 mg) by mouth daily 30 tablet 3     Shae, Zingiber officinalis, (SHAE ROOT) 550 MG CAPS capsule Take 1,100 mg by mouth daily "       Hyaluronic Acid-Vitamin C (HYALURONIC ACID PO)        lidocaine (XYLOCAINE) 5 % external ointment Apply to skin before electrolysis procedure. 35 g 1     lidocaine-prilocaine (EMLA) 2.5-2.5 % external cream Apply 1 Dose topically as needed Apply to affected area(s) 60 minutes before appointment. Cover area with plastic wrap       multivitamin w/minerals (MULTI-VITAMIN) tablet Take 1 tablet by mouth daily       Omega-3 Fatty Acids (FISH OIL PO) Take 1,400 mg by mouth daily        spironolactone (ALDACTONE) 100 MG tablet Take 1 tablet (100 mg) by mouth daily 30 tablet 3       History   Smoking Status     Never Smoker   Smokeless Tobacco     Never Used        No Known Allergies    Health Maintenance Due   Topic Date Due     MENTAL HEALTH TX PLAN  1980         Problem, Medication and Allergy Lists were reviewed and are current..         Review of Systems:   Review of Systems   Constitutional: Negative for chills, fever and unexpected weight change.   Respiratory: Negative for shortness of breath.    Cardiovascular: Negative for chest pain and leg swelling.   Neurological: Negative for light-headedness.   Psychiatric/Behavioral: Negative.    situational stress           Labs:   Results from last visit:  Orders Only on 12/14/2020   Component Date Value Ref Range Status     Sodium 12/14/2020 139  133 - 144 mmol/L Final     Potassium 12/14/2020 4.4  3.4 - 5.3 mmol/L Final     Chloride 12/14/2020 106  94 - 109 mmol/L Final     Carbon Dioxide 12/14/2020 29  20 - 32 mmol/L Final     Anion Gap 12/14/2020 3  3 - 14 mmol/L Final     Glucose 12/14/2020 84  70 - 99 mg/dL Final     Urea Nitrogen 12/14/2020 15  7 - 30 mg/dL Final     Creatinine 12/14/2020 0.84  0.52 - 1.04 mg/dL Final     GFR Estimate 12/14/2020 85  >60 mL/min/[1.73_m2] Final    Comment: Non  GFR Calc  Starting 12/18/2018, serum creatinine based estimated GFR (eGFR) will be   calculated using the Chronic Kidney Disease Epidemiology  Collaboration   (CKD-EPI) equation.       GFR Estimate If Black 12/14/2020 >90  >60 mL/min/[1.73_m2] Final    Comment:  GFR Calc  Starting 12/18/2018, serum creatinine based estimated GFR (eGFR) will be   calculated using the Chronic Kidney Disease Epidemiology Collaboration   (CKD-EPI) equation.       Calcium 12/14/2020 9.1  8.5 - 10.1 mg/dL Final     Bilirubin Total 12/14/2020 0.6  0.2 - 1.3 mg/dL Final     Albumin 12/14/2020 4.0  3.4 - 5.0 g/dL Final     Protein Total 12/14/2020 7.2  6.8 - 8.8 g/dL Final     Alkaline Phosphatase 12/14/2020 55  40 - 150 U/L Final     ALT 12/14/2020 31  0 - 50 U/L Final     AST 12/14/2020 19  0 - 45 U/L Final     Cholesterol 12/14/2020 164  <200 mg/dL Final     Triglycerides 12/14/2020 51  <150 mg/dL Final     HDL Cholesterol 12/14/2020 73  >49 mg/dL Final     LDL Cholesterol Calculated 12/14/2020 81  <100 mg/dL Final    Desirable:       <100 mg/dl     Non HDL Cholesterol 12/14/2020 91  <130 mg/dL Final         EXAM:  102/66 p58 per pt on home monitor  Weight 181.2 per pt    Constitutional: healthy, alert and no distress   Respiratory: negative,     Assessment and Plan   1. Gender dysphoria  Clinically appropriate response to current hormone dose  Labs: estradiol, testosterone prior to next visit  Repeat measurements prior to next visit    Follow-up 4 months               Video-Visit Details    Type of service:  Video Visit    Video End Time (time video stopped): 835    Originating Location (pt. Location): Home    Distant Location (provider location):  [facility]     Mode of Communication:  Video Conference via video platform: Rakesh Beck MD        [facility]  Questions were elicited and answered.     Los Beck MD

## 2021-02-09 ENCOUNTER — MYC MEDICAL ADVICE (OUTPATIENT)
Dept: FAMILY MEDICINE | Facility: CLINIC | Age: 41
End: 2021-02-09

## 2021-02-15 ENCOUNTER — VIRTUAL VISIT (OUTPATIENT)
Dept: FAMILY MEDICINE | Facility: CLINIC | Age: 41
End: 2021-02-15
Payer: COMMERCIAL

## 2021-02-15 ENCOUNTER — VIRTUAL VISIT (OUTPATIENT)
Dept: PSYCHOLOGY | Facility: CLINIC | Age: 41
End: 2021-02-15
Payer: COMMERCIAL

## 2021-02-15 DIAGNOSIS — F64.0 GENDER DYSPHORIA IN ADOLESCENT AND ADULT: Primary | ICD-10-CM

## 2021-02-15 DIAGNOSIS — Z71.3 DIETARY COUNSELING AND SURVEILLANCE: Primary | ICD-10-CM

## 2021-02-15 PROCEDURE — 90837 PSYTX W PT 60 MINUTES: CPT | Mod: U7 | Performed by: STUDENT IN AN ORGANIZED HEALTH CARE EDUCATION/TRAINING PROGRAM

## 2021-02-15 NOTE — PROGRESS NOTES
"[facility] Nutrition Reassessment    Previous goal(s):  Prevent further weight gain  Gentle weight loss 0.5-1 lb per week     Candida Francois is a 40 year old adult who presents for nutrition counseling for weight loss. Indicates low mood recently although not impacting eating for the most part. Has been working on increasing protein intake.     Diet history:  Previously on Healthy for Life Meal Plan 1200 kcal/day  Now on 1500 kcal/day vegetarian plan, with 2 steaks week (Thurs/Sun)  Breakfast: 3/4 nonfat greek yogurt, 1/3 c granola, 1.5 c cubed cantaloupe, handful almonds, cup coffee  Salads for lunch, or turkey meal  Turkey frittata over brown rice with steamed vegetables     Snacking: natural almonds, pickles, no candy, small amount of chocolate (very dark), carrots, chips and salsa, peanut butter snacks    Also, 2 times per week will have a \"junk meal\" ex: steak, potatoes, vegetables and dessert from [employer]    Physical activity: Works with  regularly  Social: Lives alone; works full time      Recent weights:   Wt Readings from Last 6 Encounters:   10/23/20 83 kg (183 lb)   09/04/20 82.9 kg (182 lb 12 oz)     180 lbs fluctuating 5-10 lbs throughout the day, pretty stable    Estimated body mass index is 24.72 kg/m  as calculated from the following:    Height as of 9/4/20: 1.8 m (5' 10.87\").    Weight as of 11/9/20: 80.1 kg (176 lb 9.6 oz).      Intervention(s):  Candida Francois is a 40 year old adult who presents for nutrition counseling for weight management.  1. Liberalize eating plan to 6940-8251 kcal if hunger persists. This may not be a permanent change but consider adding planned snacks to day and re-evaluating in a few weeks.  2. Planned snacks with emphasis on protein/fat and complex carbohydrate can help satiate more effectively and prevent grazing. Consider including foods such as chips and salsa as a part of a meal instead of a stand alone snack.      Goal(s):  Weight " "maintenance/gradual weight loss 1/2-1 lb per week    Follow up:  In one month, Monday March 15 at 9:20 am via video    Patient referred by Ivan Rodriguez MD   Total time spent with patient 23 minutes    Kayleigh Hui RD     Family Medicine Video Visit Note  Candida is being evaluated via a billable video visit.             Video Visit Consent     The patient has been notified of following:     \"This video visit will be conducted via a call between you and your physician/provider. We have found that certain health care needs can be provided without the need for an in-person physical exam.  This service lets us provide the care you need with a video conversation.  If a prescription is necessary we can send it directly to your pharmacy.  If lab work is needed we can place an order for that and you can then stop by our lab to have the test done at a later time.    Video visits are billed at different rates depending on your insurance coverage.  Please reach out to your insurance provider with any questions.    If during the course of the call the physician/provider feels a video visit is not appropriate, you will not be charged for this service.\"    Patient has given verbal consent for Video visit? Yes    How would you like to obtain your AVS? MyChart    Patient would like the video invitation sent by: Other e-mail: My Chart    Will anyone else be joining your video visit? No       Video-Visit Details    Type of service:  Video Visit    Video Start Time: 9:21 AM  THIS is the time provider and patient connect.    Video End Time (time video stopped): 9:44 AM    Originating Location (pt. Location): Home    Distant Location (provider location):  [facility]     Mode of Communication:  Video Conference via [facility]      Kayleigh Hui RD                      "

## 2021-02-15 NOTE — PROGRESS NOTES
Telemedicine Visit: The client's condition can be safely assessed and treated via synchronous audio and visual telemedicine encounter.    Reason for Telemedicine Visit: COVID-19 restrictions.  Originating Site (Client Location): Client's home  Distant Site (Provider Location): Provider Remote Setting  Consent: The client/guardian has verbally consented to: the potential risks and benefits of telemedicine (video visit) versus in person care; bill my insurance or make self-payment for services provided; and responsibility for payment of non-covered services.   Mode of Communication: Video Conference via Zoom platform because platform [employer] did not work. Client indicated verbal consent about the potential risks in not using [employer]. A secure password link for the session was generated by the [email or web address] account     As the provider I attest to compliance with applicable laws and regulations related to telemedicine.  Video start time: 8:00am  Video end time: 8:53am      [facility] -  Case Progress Note     Date of Service: 2/15/21  Client Name: Candida Francois (pronouns: she/her/her)  YOB: 1980  Age: 40 year old  MRN: [id number]  Gender/Gender Identity: Female  Treating Provider: Kenia Webb, PhD, Postdoctoral Fellow  Type of Session: Individual  Present in Session: Client only  Number of Minutes: 53 min     Current Symptoms/Status:     Gender Dysphoria: Gender and body dysphoria associated with fear and anxiety. Incongruence and distress between her biological sex and her experienced/expressed gender. Recurrent insecurities regarding her gender needs. Body and facial hair have been trigger her gender dysphoria recurrent since she has been presenting herself as woman at work. Her gender dysphoria regarding her voice has been a recurrent issue. Minority stress has been reported.     Progress Toward Treatment Goals:   Session # 17 - Client continues  to show interest and commitment towards receiving services at [facility].  Treatment plan established in 7/24/2020.  Continued working on her assignments.  Consistent progress in affirming her gender at work.  Client legally changed the name and gender marker (12/03/2020).  Started hair removal and voice therapy sessions.    Intervention: Modality and Description:   Individual, interpersonal, CBT. The focus of today's session was on aligning expectations and     Response to Intervention:   Client took time in the session to explore what does the end of the transition mean to her. Her transition consistency and diligence behavior were reinforced. Transition as a goal has been reframed and the importance of her being in contact with internal changes was highlighted. Being in touch with emotions and feelings has been an emerging topic in the client's transition process.    Assignment:  - Using hydrotherapy for improving body awareness, focus, relaxation and sensory (progressing assignment).     Interactive Complexity:  Communication difficulties present during current the psychiatric procedure include:  1. None.     Diagnosis:  302.85 (F64.0) Gender Dysphoria in Adolescents and Adults     Plan / Need for Future Services:  Return for therapy in two (2) weeks.      Kenia Webb, PhD, Postdoctoral Fellow

## 2021-02-15 NOTE — PATIENT INSTRUCTIONS
Candida Francois is a 40 year old adult who presents for nutrition counseling for weight management.  1. Liberalize eating plan to 6737-2036 kcal if hunger persists. This may not be a permanent change but consider adding planned snacks to day and re-evaluating in a few weeks.  2. Planned snacks with emphasis on protein/fat and complex carbohydrate can help satiate more effectively and prevent grazing. Consider including foods such as chips and salsa as a part of a meal instead of a stand alone snack.      Goal(s):  Weight maintenance/gradual weight loss 1/2-1 lb per week    Follow up:  In one month, Monday March 15 at 9:20 am via video

## 2021-03-01 ENCOUNTER — VIRTUAL VISIT (OUTPATIENT)
Dept: PSYCHOLOGY | Facility: CLINIC | Age: 41
End: 2021-03-01
Payer: COMMERCIAL

## 2021-03-01 DIAGNOSIS — F64.0 GENDER DYSPHORIA IN ADOLESCENT AND ADULT: Primary | ICD-10-CM

## 2021-03-01 PROCEDURE — 99207 PR NO BILLABLE SERVICE THIS VISIT: CPT | Performed by: STUDENT IN AN ORGANIZED HEALTH CARE EDUCATION/TRAINING PROGRAM

## 2021-03-01 PROCEDURE — 90837 PSYTX W PT 60 MINUTES: CPT | Mod: U7 | Performed by: STUDENT IN AN ORGANIZED HEALTH CARE EDUCATION/TRAINING PROGRAM

## 2021-03-02 NOTE — PROGRESS NOTES
Telemedicine Visit: The client's condition can be safely assessed and treated via synchronous audio and visual telemedicine encounter.    Reason for Telemedicine Visit: COVID-19 restrictions.  Originating Site (Client Location): Client's home  Distant Site (Provider Location): Provider Remote Setting  Consent: The client/guardian has verbally consented to: the potential risks and benefits of telemedicine (video visit) versus in person care; bill my insurance or make self-payment for services provided; and responsibility for payment of non-covered services.   Mode of Communication: Video Conference via Zoom platform because platform [employer] did not work. Client indicated verbal consent about the potential risks in not using [employer]. A secure password link for the session was generated by the [email or web address] account     As the provider I attest to compliance with applicable laws and regulations related to telemedicine.  Video start time: 8:00am  Video end time: 8:53am      [facility] -  Case Progress Note     Date of Service: 3/01/21  Client Name: Candida Francois (pronouns: she/her/her)  YOB: 1980  Age: 40 year old  MRN: [id number]  Gender/Gender Identity: Female  Treating Provider: Kenia Webb, PhD, Postdoctoral Fellow  Type of Session: Individual  Present in Session: Client only  Number of Minutes: 53 min     Current Symptoms/Status:     Gender Dysphoria: Gender and body dysphoria associated with fear and anxiety. Incongruence and distress between her biological sex and her experienced/expressed gender. Recurrent insecurities regarding her gender needs. Body and facial hair have been trigger her gender dysphoria recurrent since she has been presenting herself as woman at work. Her gender dysphoria regarding her voice has been a recurrent issue. Minority stress has been reported.     Progress Toward Treatment Goals:   Session # 18 - Client continues  to show interest and commitment towards receiving services at [facility].  Treatment plan established in 7/24/2020.  Continued working on her assignments.  Consistent progress in affirming her gender at work.  Client legally changed the name and gender marker (12/03/2020).  Started hair removal and voice therapy sessions.    Intervention: Modality and Description:   Individual, interpersonal, CBT. The focus of today's session was on aligning expectations and     Response to Intervention:   Client reframed the strong emotional response that she had in the last session. She has identified that it is difficult for her to have some limitations that she will have in the body regarding her transition. Reinforced the importance of her not pushing her so hard in the process and discussed ways for her to establish actions towards relaxation and well-being. A draft timeline for gender affirmation surgeries was established.    Assignment:  - Using hydrotherapy for improving body awareness, focus, relaxation and sensory (progressing assignment).     Interactive Complexity:  Communication difficulties present during current the psychiatric procedure include:  1. None.     Diagnosis:  302.85 (F64.0) Gender Dysphoria in Adolescents and Adults     Plan / Need for Future Services:  Return for therapy in two (2) weeks.      Kenia Webb, PhD, Postdoctoral Fellow

## 2021-03-07 ENCOUNTER — HEALTH MAINTENANCE LETTER (OUTPATIENT)
Age: 41
End: 2021-03-07

## 2021-03-10 DIAGNOSIS — F64.0 GENDER DYSPHORIA IN ADOLESCENT AND ADULT: ICD-10-CM

## 2021-03-11 RX ORDER — ESTRADIOL 0.1 MG/D
2 FILM, EXTENDED RELEASE TRANSDERMAL
Qty: 16 PATCH | Refills: 3 | Status: SHIPPED | OUTPATIENT
Start: 2021-03-11 | End: 2021-05-17

## 2021-03-11 NOTE — TELEPHONE ENCOUNTER
Last Office Visit: 10/23/20  Future [facility] Appointments: NONE  Medication last refilled: 12/10/20 #16 patches + 3 refills    Routing refill request to provider for review/approval because:  Do you want more refills - last order was for total of 4 months    Anna Ventura RN  03/11/21  3:37 PM

## 2021-03-11 NOTE — PROGRESS NOTES
"[facility] Nutrition Reassessment    Previous goal(s):  Prevent further weight gain - Goal met  Gentle weight loss 0.5-1 lb per week - In progress, ongoing    Candida Jennifer Francois is a 40 year old adult who presents for ongoing nutrition counseling for weight management. Since our last visit, \"doing pretty well\". Feels that the previous weight gain was related to eating/snacking on salty foods (especially pickles). Has since removed these foods from the kitchen for the past 2 weeks . Started eating steaks again but preparing at home instead of [employer]. Tried to eat the 1200 kcal meal plan again for a week but was hungry which reinforced that she needs additional calories, closer to 1500 kcals.   Including 2 girl  cookies per meal.    Recent diet recall:  No significant changes from previous  Now on 1500 kcal/day vegetarian plan, with 2 steaks week (Thurs/Sun)  Breakfast: 3/4 nonfat greek yogurt, 1/3 c granola, 1.5 c cubed cantaloupe, handful almonds, cup coffee  Salads for lunch, or turkey meal  Turkey frittata over brown rice with steamed vegetables     Snacking: natural almonds, no candy, small amount of chocolate (very dark), carrots, chips and salsa, peanut butter snacks. Eating 2 girl  cookies with each meal.     Physical activity: Works with  regularly  Social: Lives alone; works full time  Medications include: spironolactone, finasteride, estridiol  Vitamins/Supplements: omega-3, MVI, bri, biotin    Recent weights:   Wt Readings from Last 6 Encounters:   10/23/20 83 kg (183 lb)   09/04/20 82.9 kg (182 lb 12 oz)     Self reported weight:  2/15: 180 lbs fluctuating 5-10 lbs throughout the day, pretty stable      Intervention(s):  1) Include additional protein with vegetarian meal plan. Hopefully this will help reduce the need for as much snacking between meals. Discussed the idea of preparing bulk chicken breast and including around 3-4 oz with meals. Can also use tuna.   2). " "Continue to avoid very salty snacks    Monitoring/Evaluation:  Follow up:  4 weeks, Candida will reach out to schedule, work schedule unknown currently.     Patient referred by Ivan Rodriguez MD   Total time spent with patient 21 minutes    Kayleigh Hui RD     Family Medicine Video Visit Note  Candida is being evaluated via a billable video visit.             Video Visit Consent     The patient has been notified of following:     \"This video visit will be conducted via a call between you and your physician/provider. We have found that certain health care needs can be provided without the need for an in-person physical exam.  This service lets us provide the care you need with a video conversation.  If a prescription is necessary we can send it directly to your pharmacy.  If lab work is needed we can place an order for that and you can then stop by our lab to have the test done at a later time.    Video visits are billed at different rates depending on your insurance coverage.  Please reach out to your insurance provider with any questions.    If during the course of the call the physician/provider feels a video visit is not appropriate, you will not be charged for this service.\"    Patient has given verbal consent for Video visit? Yes    How would you like to obtain your AVS? MyChart    Patient would like the video invitation sent by: Other e-mail: My Chart    Will anyone else be joining your video visit? No       Video-Visit Details    Type of service:  Video Visit    Video Start Time: 9:21 AM  THIS is the time provider and patient connect.    Video End Time (time video stopped): 9:40 AM    Originating Location (pt. Location): Home    Distant Location (provider location):  [facility]     Mode of Communication:  Video Conference via Mckinley Hui RD                      "

## 2021-03-15 ENCOUNTER — VIRTUAL VISIT (OUTPATIENT)
Dept: FAMILY MEDICINE | Facility: CLINIC | Age: 41
End: 2021-03-15
Payer: COMMERCIAL

## 2021-03-15 ENCOUNTER — VIRTUAL VISIT (OUTPATIENT)
Dept: PSYCHOLOGY | Facility: CLINIC | Age: 41
End: 2021-03-15
Payer: COMMERCIAL

## 2021-03-15 DIAGNOSIS — F64.0 GENDER DYSPHORIA IN ADOLESCENT AND ADULT: Primary | ICD-10-CM

## 2021-03-15 DIAGNOSIS — Z71.3 DIETARY COUNSELING AND SURVEILLANCE: Primary | ICD-10-CM

## 2021-03-15 PROCEDURE — 90837 PSYTX W PT 60 MINUTES: CPT | Mod: U7 | Performed by: STUDENT IN AN ORGANIZED HEALTH CARE EDUCATION/TRAINING PROGRAM

## 2021-03-15 PROCEDURE — 99207 PR NO BILLABLE SERVICE THIS VISIT: CPT | Performed by: STUDENT IN AN ORGANIZED HEALTH CARE EDUCATION/TRAINING PROGRAM

## 2021-03-16 NOTE — PROGRESS NOTES
Telemedicine Visit: The client's condition can be safely assessed and treated via synchronous audio and visual telemedicine encounter.    Reason for Telemedicine Visit: COVID-19 restrictions.  Originating Site (Client Location): Client's home  Distant Site (Provider Location): Provider Remote Setting  Consent: The client/guardian has verbally consented to: the potential risks and benefits of telemedicine (video visit) versus in person care; bill my insurance or make self-payment for services provided; and responsibility for payment of non-covered services.   Mode of Communication: Video Conference via Zoom.     As the provider I attest to compliance with applicable laws and regulations related to telemedicine.  Video start time: 8:00am  Video end time: 8:53am      [facility] -  Case Progress Note     Date of Service: 3/15/21  Client Name: Candida Francois (pronouns: she/her/her)  YOB: 1980  Age: 40 year old  MRN: [id number]  Gender/Gender Identity: Female  Treating Provider: Kenia Webb, PhD, Postdoctoral Fellow  Type of Session: Individual  Type of Visit: Telemedicine  Present in Session: Client only  Number of Minutes: 53 min     Current Symptoms/Status:     Gender Dysphoria: Gender and body dysphoria associated with fear and anxiety. Incongruence and distress between her biological sex and her experienced/expressed gender. Recurrent insecurities regarding her gender needs. Body and facial hair have been trigger her gender dysphoria recurrent since she has been presenting herself as woman at work. Her gender dysphoria regarding her voice has been a recurrent issue. Minority stress has been reported. In the process of healing the connection to her body.     Progress Toward Treatment Goals:   Session # 19 - Client continues to show interest and commitment towards receiving services at [facility].  Treatment plan established in 7/24/2020.  Continued working on her  assignments.  Consistent progress in affirming her gender at work.  Client legally changed the name and gender marker (12/03/2020).  Started hair removal and voice therapy sessions.    Intervention: Modality and Description:   Individual, interpersonal, CBT. The focus of today's session was on talking about her body awareness.     Response to Intervention:   Client celebrated changes on her birth certificate and how this achievement was positive for her. Client has noticed an increase in body awareness in sensitivity and ways of perceiving the world. Discussed how it has been helping build her identity. Reinforced aspects of self-care and daily dedication to appearance. Client still operating in goal-oriented. Improvements in well-being and being in the present.    Assignment:  - Using hydrotherapy for improving body awareness, focus, relaxation and sensory (progressing assignment).     Interactive Complexity:  Communication difficulties present during current the psychiatric procedure include:  1. None.     Diagnosis:  302.85 (F64.0) Gender Dysphoria in Adolescents and Adults     Plan / Need for Future Services:  Return for individual therapy in two (2) weeks.      Kenia Webb, PhD, Postdoctoral Fellow

## 2021-03-17 NOTE — PATIENT INSTRUCTIONS
Intervention(s):  1) Include additional protein with vegetarian meal plan. Hopefully this will help reduce the need for as much snacking between meals. Discussed the idea of preparing bulk chicken breast and including around 3-4 oz with meals. Can also use tuna.   2). Continue to avoid very salty snacks    Monitoring/Evaluation:  Follow up:  4 weeks, Candida will reach out to schedule, work schedule unknown currently.     Kayleigh Hui RD

## 2021-03-19 ENCOUNTER — MYC REFILL (OUTPATIENT)
Dept: FAMILY MEDICINE | Facility: CLINIC | Age: 41
End: 2021-03-19

## 2021-03-19 DIAGNOSIS — F64.0 GENDER DYSPHORIA IN ADOLESCENT AND ADULT: ICD-10-CM

## 2021-03-19 RX ORDER — HYDROCODONE BITARTRATE AND ACETAMINOPHEN 5; 325 MG/1; MG/1
TABLET ORAL
Qty: 1 TABLET | Refills: 0 | Status: SHIPPED | OUTPATIENT
Start: 2021-03-19 | End: 2021-03-22

## 2021-03-19 NOTE — TELEPHONE ENCOUNTER
I have queried the [place] Prescription Monitoring Program for this patient to determine if they are an appropriate candidate for a controlled substance prescription. There is no evidence of prescription misuse based on this  search.    Ivan Rodriguez MD on 3/19/2021 at 8:13 AM

## 2021-03-22 ENCOUNTER — MYC MEDICAL ADVICE (OUTPATIENT)
Dept: FAMILY MEDICINE | Facility: CLINIC | Age: 41
End: 2021-03-22

## 2021-03-22 DIAGNOSIS — F64.0 GENDER DYSPHORIA IN ADOLESCENT AND ADULT: ICD-10-CM

## 2021-03-22 RX ORDER — HYDROCODONE BITARTRATE AND ACETAMINOPHEN 5; 325 MG/1; MG/1
TABLET ORAL
Qty: 1 TABLET | Refills: 0 | Status: SHIPPED | OUTPATIENT
Start: 2021-03-22 | End: 2021-05-02

## 2021-03-22 NOTE — TELEPHONE ENCOUNTER
Called Candida,    Electrolysis schedule is changing to be weekly instead of every other week.  Will need hydrocodone every week instead of every other week, which is absolutely fine with me    Ivan Rodriguez MD on 3/22/2021 at 5:37 PM     [FreeTextEntry1] : 49 yo female with history of invasive ductal cancer of the right breast, now s/p b/l breast reconstruction with KWAME flaps 11/25/19. pt doing well \par denies pain, fever\par pt went back to the operating room and Dr. Riley removed more lymph nodes 1/3/20\par pt to start Anastrazole after radiation as per Dr Riley\par

## 2021-03-22 NOTE — PROGRESS NOTES
I did not personally see the patient. I reviewed and agree with the assessment and plan of this note.         Marian Ramirez PsyD, LMFT

## 2021-03-26 DIAGNOSIS — F64.0 GENDER DYSPHORIA IN ADOLESCENT AND ADULT: ICD-10-CM

## 2021-03-26 RX ORDER — FINASTERIDE 5 MG/1
TABLET, FILM COATED ORAL
Qty: 30 TABLET | Refills: 0 | Status: SHIPPED | OUTPATIENT
Start: 2021-03-26 | End: 2021-04-27

## 2021-03-26 NOTE — TELEPHONE ENCOUNTER
Last Office Visit: 10/23/20  [facility]: 4/26/21  Medication last refilled: 12/8/20 #30 + 3 refills    Prescription approved per Refill Protocol.  Anna Ventura RN  03/26/21  12:30 PM

## 2021-03-29 ENCOUNTER — VIRTUAL VISIT (OUTPATIENT)
Dept: PSYCHOLOGY | Facility: CLINIC | Age: 41
End: 2021-03-29
Payer: COMMERCIAL

## 2021-03-29 DIAGNOSIS — F64.0 GENDER DYSPHORIA IN ADOLESCENT AND ADULT: Primary | ICD-10-CM

## 2021-03-29 PROCEDURE — 99207 PR NO BILLABLE SERVICE THIS VISIT: CPT | Performed by: STUDENT IN AN ORGANIZED HEALTH CARE EDUCATION/TRAINING PROGRAM

## 2021-03-29 PROCEDURE — 90837 PSYTX W PT 60 MINUTES: CPT | Mod: U7 | Performed by: STUDENT IN AN ORGANIZED HEALTH CARE EDUCATION/TRAINING PROGRAM

## 2021-03-30 NOTE — PROGRESS NOTES
Telemedicine Visit: The client's condition can be safely assessed and treated via synchronous audio and visual telemedicine encounter.    Reason for Telemedicine Visit: COVID-19 restrictions.  Originating Site (Client Location): Client's home  Distant Site (Provider Location): Provider Remote Setting  Consent: The client/guardian has verbally consented to: the potential risks and benefits of telemedicine (video visit) versus in person care; bill my insurance or make self-payment for services provided; and responsibility for payment of non-covered services.   Mode of Communication: Video Conference via Zoom.     As the provider I attest to compliance with applicable laws and regulations related to telemedicine.  Video start time: 8:00am  Video end time: 8:53am      [facility] -  Case Progress Note     Date of Service: 3/29/21  Client Name: Candida Francois (pronouns: she/her/her)  YOB: 1980  Age: 40 year old  MRN: [id number]  Gender/Gender Identity: Female  Treating Provider: Kenia Webb, PhD, Postdoctoral Fellow  Type of Session: Individual  Type of Visit: Telemedicine  Present in Session: Client only  Number of Minutes: 53 min     Current Symptoms/Status:     Gender Dysphoria: Gender and body dysphoria associated with fear and anxiety. Incongruence and distress between her biological sex and her experienced/expressed gender. Recurrent insecurities regarding her gender needs. Body and facial hair have been trigger her gender dysphoria recurrent since she has been presenting herself as woman at work. Her gender dysphoria regarding her voice has been a recurrent issue. Minority stress has been reported. In the process of healing the connection to her body.     Progress Toward Treatment Goals:   Session # 20 - Client continues to show interest and commitment towards receiving services at [facility].  Treatment plan established in 7/24/2020.  Continued working on her  assignments.  Consistent progress in affirming her gender at work.  Client legally changed the name and gender marker (12/03/2020).  Started hair removal and voice therapy sessions.    Intervention: Modality and Description:   Individual, interpersonal, CBT. The focus of today's session was on talking about her body awareness (continue).     Response to Intervention:   The interventions in the last session reinforced the engagement in the  hydrotherapy. She continues to explore and establish a positive outlook for her body. The concept of figure-ground perception was discussed to improve the navigation of sensory stimuli. Client have been allowing her to be in more frequent contact with her emotions.    Assignment:  - Using hydrotherapy for improving body awareness, focus, relaxation and sensory (progressing assignment).     Interactive Complexity:  Communication difficulties present during current the psychiatric procedure include:  1. None.     Diagnosis:  302.85 (F64.0) Gender Dysphoria in Adolescents and Adults     Plan / Need for Future Services:  Return for individual therapy in two (2) weeks.      Kenia Webb, PhD, Postdoctoral Fellow

## 2021-04-13 ENCOUNTER — VIRTUAL VISIT (OUTPATIENT)
Dept: PSYCHOLOGY | Facility: CLINIC | Age: 41
End: 2021-04-13
Payer: COMMERCIAL

## 2021-04-13 DIAGNOSIS — F64.0 GENDER DYSPHORIA IN ADOLESCENT AND ADULT: Primary | ICD-10-CM

## 2021-04-13 PROCEDURE — 90837 PSYTX W PT 60 MINUTES: CPT | Mod: 95 | Performed by: STUDENT IN AN ORGANIZED HEALTH CARE EDUCATION/TRAINING PROGRAM

## 2021-04-13 PROCEDURE — 99207 PR NO BILLABLE SERVICE THIS VISIT: CPT | Performed by: STUDENT IN AN ORGANIZED HEALTH CARE EDUCATION/TRAINING PROGRAM

## 2021-04-14 NOTE — PROGRESS NOTES
Telemedicine Visit: The client's condition can be safely assessed and treated via synchronous audio and visual telemedicine encounter.    Reason for Telemedicine Visit: COVID-19 restrictions.  Originating Site (Client Location): Client's home  Distant Site (Provider Location): Provider Remote Setting  Consent: The client/guardian has verbally consented to: the potential risks and benefits of telemedicine (video visit) versus in person care; bill my insurance or make self-payment for services provided; and responsibility for payment of non-covered services.   Mode of Communication: Video Conference via Zoom.     As the provider I attest to compliance with applicable laws and regulations related to telemedicine.  Video start time: 5:00pm  Video end time: 5:53pm      [facility] -  Case Progress Note     Date of Service: 4/13/21  Client Name: Candida Francois (pronouns: she/her/her)  YOB: 1980  Age: 40 year old  MRN: [id number]  Gender/Gender Identity: Female  Treating Provider: Kenia Webb, PhD, Postdoctoral Fellow  Type of Session: Individual  Type of Visit: Telemedicine  Present in Session: Client only  Number of Minutes: 53 min     Current Symptoms/Status:     Gender Dysphoria: Gender and body dysphoria associated with fear and anxiety. Incongruence and distress between her biological sex and her experienced/expressed gender. Recurrent insecurities regarding her gender needs. Body and facial hair have been trigger her gender dysphoria recurrent since she has been presenting herself as woman at work. Her gender dysphoria regarding her voice has been a recurrent issue. Minority stress has been reported. In the process of healing the connection to her body. Connecting her identity with today's life has been a process.      Progress Toward Treatment Goals:   Session # 21 - Client continues to show interest and commitment towards receiving services at [facility].  Treatment plan established  in 7/24/2020.  Continued working on her assignments.  Consistent progress in affirming her gender at work.  Client legally changed the name and gender marker (12/03/2020).  Started hair removal and voice therapy sessions.    Intervention: Modality and Description:   Individual, interpersonal, CBT. The focus of today's session was on talking about her plans.     Response to Intervention:   Client has been restructuring her life and her career. Her current plan and her positive attitudes that she had regarding her identity were reinforced. Having a plan has helped her to be in the present and more aware to her needs. Her well-being has been a recurrent theme of therapeutic work.    Assignment:  - Using hydrotherapy for improving body awareness, focus, relaxation and sensory (progressing assignment).     Interactive Complexity:  Communication difficulties present during current the psychiatric procedure include:  1. None.     Diagnosis:  302.85 (F64.0) Gender Dysphoria in Adolescents and Adults     Plan / Need for Future Services:  Return for individual therapy in two (2) weeks.      Kenia Webb, PhD, Postdoctoral Fellow

## 2021-04-22 NOTE — PROGRESS NOTES
ASSESSMENT AND PLAN:     COUNSELING:   Reviewed preventive health counseling, as reflected in patient instructions       Regular exercise       Immunizations       Consider Hep C screening for all patients one time for ages 18-79 years       HIV screeninx in teen years, 1x in adult years, and at intervals if high risk    (Z00.00) Wellness examination  (primary encounter diagnosis)  Comment: Age appropriate screening and preventive services provided. Recommended COVID-19 vaccination (Candida has some mild hesitancy). Declined HIV/Hep C testing today - deferred for one year. BP excellent. Up to date on lipid/diabetes screening.   Plan:     (F64.0) Gender dysphoria in adolescent and adult  Comment: Update hormone labs today. Continue hydrocodone weekly with electrolysis sessions. Candida inquired about self-injected novocain. Discussed some of the risks and benefits to this approach.    Plan:     Ivan Rodriguez MD  [facility]  2021, 9:24 AM      SUBJECTIVE:   Candida Francois is a 40 year old adult who presents to clinic today for an annual wellness exam.     # Gender Dysphoria  - follows with Dr. Beck  - started HRT in 2020  - on Vivelle dot 0.1mg patch 2x/week, spironolactone 100mg daily, and finasteride 5mg daily     # Electrolysis  - using EMLA cream and hydrocodone 5mg   - getting every other week, about to move to every week    # Health Maintenance  - HIV Screening: had been tested about 3 years ago  - Hep C Screening: wishes to defer  - BP:   BP Readings from Last 3 Encounters:   21 107/73   10/23/20 124/80   20 114/83   - Cholesterol:   Recent Labs   Lab Test 20  0918   CHOL 164   HDL 73   LDL 81   TRIG 51     - Diabetes Screenin20 random glucose 84    Today's PHQ-2 Score:   PHQ-2 (  [employer]) 2021   Q1: Little interest or pleasure in doing things 0 1   Q2: Feeling down, depressed or hopeless 0 1   PHQ-2 Score 0 2     Review of  Systems:   Constitutional - no fevers, chills, night sweats, unintentional weight loss/gain   Eyes - no vision concerns   Ears/Nose/Throat - no hearing concerns, no dysphagia/odynophagia   Cardiovascular - no chest pain, palpitations   Pulmonary - no shortness of breath, wheezing, coughing   GI - no abdominal pain, constipation, diarrhea, nausea, vomiting    - no dysuria, polyuria, hematuria   Musculoskeletal - no joint or muscle pain  Integument - no rash   Neuro - no weakness, numbness, paresthesia   Heme - no easy bruising/bleeding   Endocrine - no polyuria, weight loss/gain, dry skin, excessive sweating, hair loss   Psychiatric - no feelings of depressed mood or anhedonia in past 2 weeks   Allergic/Immunologic - no history of anaphylaxis, no history of recurrent infections    History reviewed. No pertinent past medical history.  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       Current Outpatient Medications   Medication     BIOTIN PO     cetirizine (ZYRTEC) 10 MG tablet     estradiol (VIVELLE-DOT) 0.1 MG/24HR bi-weekly patch     finasteride (PROSCAR) 5 MG tablet     Shae, Zingiber officinalis, (SHAE ROOT) 550 MG CAPS capsule     Hyaluronic Acid-Vitamin C (HYALURONIC ACID PO)     HYDROcodone-acetaminophen (NORCO) 5-325 MG tablet     lidocaine-prilocaine (EMLA) 2.5-2.5 % external cream     multivitamin w/minerals (MULTI-VITAMIN) tablet     Omega-3 Fatty Acids (FISH OIL PO)     spironolactone (ALDACTONE) 100 MG tablet     Vitamin D3 (CHOLECALCIFEROL) 25 mcg (1000  "units) tablet     Current Facility-Administered Medications   Medication     lidocaine 1% with EPINEPHrine 1:100,000 injection 3 mL     I have reviewed the patient's past medical, surgical, family, and social history.     OBJECTIVE:   /73 (BP Location: Right arm, Patient Position: Sitting, Cuff Size: Adult Regular)   Pulse 79   Temp 99  F (37.2  C) (Skin)   Resp 15   Ht 1.816 m (5' 11.5\")   Wt 84.1 kg (185 lb 8 oz)   SpO2 99%   BMI 25.51 kg/m      Constitutional: well-appearing, appears stated age  Eyes: conjunctivae without erythema, sclera anicteric. Pupils equal, round, and reactive to light.   ENT: oropharynx clear, TM grey bilateral  Cardiac: regular rate and rhythm, normal S1/S2, no murmur/rubs/gallops  Respiratory: lungs clear to auscultation bilaterally, normal work of breathing, no wheezes/crackles  GI: abdomen soft, non-tender, non-distended  Extremities: warm and well perfused, radial pulses 2+ and equal, cap refill brisk.  Lymph: no cervical or supraclavicular lymphadenopathy  Skin: no rashes, lesions, or wounds  Psych: affect is full and appropriate, speech is fluent and non-pressured  "

## 2021-04-25 ENCOUNTER — HEALTH MAINTENANCE LETTER (OUTPATIENT)
Age: 41
End: 2021-04-25

## 2021-04-26 ENCOUNTER — OFFICE VISIT (OUTPATIENT)
Dept: FAMILY MEDICINE | Facility: CLINIC | Age: 41
End: 2021-04-26
Payer: COMMERCIAL

## 2021-04-26 VITALS
RESPIRATION RATE: 15 BRPM | SYSTOLIC BLOOD PRESSURE: 107 MMHG | OXYGEN SATURATION: 99 % | DIASTOLIC BLOOD PRESSURE: 73 MMHG | WEIGHT: 185.5 LBS | HEIGHT: 72 IN | HEART RATE: 79 BPM | BODY MASS INDEX: 25.12 KG/M2 | TEMPERATURE: 99 F

## 2021-04-26 DIAGNOSIS — Z00.00 WELLNESS EXAMINATION: Primary | ICD-10-CM

## 2021-04-26 DIAGNOSIS — F64.0 GENDER DYSPHORIA IN ADOLESCENT AND ADULT: ICD-10-CM

## 2021-04-26 LAB — ESTRADIOL SERPL-MCNC: 225 PG/ML

## 2021-04-26 RX ORDER — VITAMIN B COMPLEX
TABLET ORAL DAILY
COMMUNITY

## 2021-04-26 SDOH — HEALTH STABILITY: MENTAL HEALTH: HOW MANY STANDARD DRINKS CONTAINING ALCOHOL DO YOU HAVE ON A TYPICAL DAY?: 1 OR 2

## 2021-04-26 SDOH — HEALTH STABILITY: MENTAL HEALTH: HOW OFTEN DO YOU HAVE A DRINK CONTAINING ALCOHOL?: MONTHLY OR LESS

## 2021-04-26 SDOH — HEALTH STABILITY: MENTAL HEALTH: HOW OFTEN DO YOU HAVE SIX OR MORE DRINKS ON ONE OCCASION?: NEVER

## 2021-04-26 SDOH — HEALTH STABILITY: MENTAL HEALTH: HOW OFTEN DO YOU HAVE 6 OR MORE DRINKS ON ONE OCCASION?: NEVER

## 2021-04-26 SDOH — HEALTH STABILITY: MENTAL HEALTH: HOW MANY DRINKS CONTAINING ALCOHOL DO YOU HAVE ON A TYPICAL DAY WHEN YOU ARE DRINKING?: 1 OR 2

## 2021-04-26 ASSESSMENT — MIFFLIN-ST. JEOR: SCORE: 1610.48

## 2021-04-26 NOTE — NURSING NOTE
"40 year old  Chief Complaint   Patient presents with     Physical     and labs        Blood pressure 107/73, pulse 79, temperature 99  F (37.2  C), temperature source Skin, resp. rate 15, height 1.816 m (5' 11.5\"), weight 84.1 kg (185 lb 8 oz), SpO2 99 %. Body mass index is 25.51 kg/m .  Patient Active Problem List   Diagnosis     Gender dysphoria in adolescent and adult       Wt Readings from Last 2 Encounters:   04/26/21 84.1 kg (185 lb 8 oz)   10/23/20 83 kg (183 lb)     BP Readings from Last 3 Encounters:   04/26/21 107/73   10/23/20 124/80   09/04/20 114/83         Current Outpatient Medications   Medication     BIOTIN PO     cetirizine (ZYRTEC) 10 MG tablet     estradiol (VIVELLE-DOT) 0.1 MG/24HR bi-weekly patch     finasteride (PROSCAR) 5 MG tablet     Shae, Zingiber officinalis, (SHAE ROOT) 550 MG CAPS capsule     Hyaluronic Acid-Vitamin C (HYALURONIC ACID PO)     HYDROcodone-acetaminophen (NORCO) 5-325 MG tablet     lidocaine-prilocaine (EMLA) 2.5-2.5 % external cream     multivitamin w/minerals (MULTI-VITAMIN) tablet     Omega-3 Fatty Acids (FISH OIL PO)     spironolactone (ALDACTONE) 100 MG tablet     Vitamin D3 (CHOLECALCIFEROL) 25 mcg (1000 units) tablet     Current Facility-Administered Medications   Medication     lidocaine 1% with EPINEPHrine 1:100,000 injection 3 mL       Social History     Tobacco Use     Smoking status: Never Smoker     Smokeless tobacco: Never Used   Substance Use Topics     Alcohol use: Yes     Frequency: Monthly or less     Drinks per session: 1 or 2     Drug use: Never       Health Maintenance Due   Topic Date Due     PREVENTIVE CARE VISIT  Never done     MENTAL HEALTH TX PLAN  Never done     HIV SCREENING  Never done     COVID-19 Vaccine (1) Never done     HEPATITIS C SCREENING  Never done     PAP  Never done     PHQ-9  05/14/2021       No results found for: PAP      April 26, 2021 8:58 AM    "

## 2021-04-27 ENCOUNTER — VIRTUAL VISIT (OUTPATIENT)
Dept: PSYCHOLOGY | Facility: CLINIC | Age: 41
End: 2021-04-27
Payer: COMMERCIAL

## 2021-04-27 DIAGNOSIS — F64.0 GENDER DYSPHORIA IN ADOLESCENT AND ADULT: Primary | ICD-10-CM

## 2021-04-27 PROCEDURE — 99207 PR NO BILLABLE SERVICE THIS VISIT: CPT | Performed by: STUDENT IN AN ORGANIZED HEALTH CARE EDUCATION/TRAINING PROGRAM

## 2021-04-27 PROCEDURE — 90837 PSYTX W PT 60 MINUTES: CPT | Mod: 95 | Performed by: STUDENT IN AN ORGANIZED HEALTH CARE EDUCATION/TRAINING PROGRAM

## 2021-04-28 LAB
SHBG SERPL-SCNC: 58 NMOL/L (ref 30–135)
TESTOST FREE SERPL-MCNC: 0.1 NG/DL (ref 0.11–0.58)
TESTOST SERPL-MCNC: 9 NG/DL (ref 8–60)

## 2021-04-28 NOTE — PROGRESS NOTES
Telemedicine Visit: The client's condition can be safely assessed and treated via synchronous audio and visual telemedicine encounter.    Reason for Telemedicine Visit: COVID-19 restrictions.  Originating Site (Client Location): Client's home  Distant Site (Provider Location): Provider Remote Setting  Consent: The client/guardian has verbally consented to: the potential risks and benefits of telemedicine (video visit) versus in person care; bill my insurance or make self-payment for services provided; and responsibility for payment of non-covered services.   Mode of Communication: Video Conference via Zoom.     As the provider I attest to compliance with applicable laws and regulations related to telemedicine.  Video start time: 5:00pm  Video end time: 5:53pm      [facility] -  Case Progress Note     Date of Service: 4/27/21  Client Name: Candida Francois (pronouns: she/her/her)  YOB: 1980  Age: 40 year old  MRN: [id number]  Gender/Gender Identity: Female  Treating Provider: Kenia Webb, PhD, Postdoctoral Fellow  Type of Session: Individual  Type of Visit: Telemedicine  Present in Session: Client only  Number of Minutes: 53 min     Current Symptoms/Status:     Gender Dysphoria: Gender and body dysphoria associated with fear and anxiety. Incongruence and distress between her biological sex and her experienced/expressed gender. Recurrent insecurities regarding her gender needs. Body and facial hair have been trigger her gender dysphoria recurrent since she has been presenting herself as woman at work. Her gender dysphoria regarding her voice has been a recurrent issue. Minority stress has been reported. In the process of healing the connection to her body. Connecting her identity with today's life has been a process.      Progress Toward Treatment Goals:   Session # 22 - Client continues to show interest and commitment towards receiving services at [facility].  Treatment plan established  in 7/24/2020.  Continued working on her assignments.  Consistent progress in affirming her gender at work.  Client is committed to taking care of herself and she has maintained a solid transition plan.  Client legally changed the name and gender marker (12/03/2020).  Started hair removal and voice therapy sessions.    Intervention: Modality and Description:   Individual, interpersonal, CBT. The focus of today's session was on talking about her plans (continue).     Response to Intervention:   Cient explored the feelings of being noticed as a woman and how it has been helping her life and career. She processed her decision to ensure that she was not prepared for a relationship, even though she received some interests from people. She reframed it as something positive for her well-being. A timeline was discussed to prepare her for the appointment regarding a gender affirmation surgery (GAS); vaginoplasty. Support letters will be addressed in future sessions and another provider is required.    Assignment:  - Using hydrotherapy for improving body awareness, focus, relaxation and sensory (progressing assignment).     Interactive Complexity:  Communication difficulties present during current the psychiatric procedure include:  1. None.     Diagnosis:  302.85 (F64.0) Gender Dysphoria in Adolescents and Adults     Plan / Need for Future Services:  Return for individual therapy in two (2) weeks.      Kenia Webb, PhD, Postdoctoral Fellow

## 2021-05-02 ENCOUNTER — MYC REFILL (OUTPATIENT)
Dept: FAMILY MEDICINE | Facility: CLINIC | Age: 41
End: 2021-05-02

## 2021-05-02 DIAGNOSIS — F64.0 GENDER DYSPHORIA IN ADOLESCENT AND ADULT: ICD-10-CM

## 2021-05-03 ENCOUNTER — VIRTUAL VISIT (OUTPATIENT)
Dept: PSYCHOLOGY | Facility: CLINIC | Age: 41
End: 2021-05-03
Payer: COMMERCIAL

## 2021-05-03 DIAGNOSIS — F64.0 GENDER DYSPHORIA IN ADOLESCENT AND ADULT: Primary | ICD-10-CM

## 2021-05-03 PROCEDURE — 90837 PSYTX W PT 60 MINUTES: CPT | Mod: 95 | Performed by: STUDENT IN AN ORGANIZED HEALTH CARE EDUCATION/TRAINING PROGRAM

## 2021-05-03 PROCEDURE — 99207 PR NO BILLABLE SERVICE THIS VISIT: CPT | Performed by: STUDENT IN AN ORGANIZED HEALTH CARE EDUCATION/TRAINING PROGRAM

## 2021-05-03 RX ORDER — HYDROCODONE BITARTRATE AND ACETAMINOPHEN 5; 325 MG/1; MG/1
TABLET ORAL
Qty: 1 TABLET | Refills: 0 | Status: SHIPPED | OUTPATIENT
Start: 2021-05-03 | End: 2021-05-11

## 2021-05-10 NOTE — RESULT ENCOUNTER NOTE
Dear Candida,     Here are your recent results. Your labs are normal, with estradiol at the upper end of the appropriate female range    Please let us know if you have any questions or concerns.    Regards,  Los Beck MD

## 2021-05-11 ENCOUNTER — MYC REFILL (OUTPATIENT)
Dept: FAMILY MEDICINE | Facility: CLINIC | Age: 41
End: 2021-05-11

## 2021-05-11 DIAGNOSIS — F64.0 GENDER DYSPHORIA IN ADOLESCENT AND ADULT: ICD-10-CM

## 2021-05-11 RX ORDER — HYDROCODONE BITARTRATE AND ACETAMINOPHEN 5; 325 MG/1; MG/1
TABLET ORAL
Qty: 1 TABLET | Refills: 0 | Status: SHIPPED | OUTPATIENT
Start: 2021-05-11 | End: 2021-05-21

## 2021-05-17 ENCOUNTER — VIRTUAL VISIT (OUTPATIENT)
Dept: PSYCHOLOGY | Facility: CLINIC | Age: 41
End: 2021-05-17
Payer: COMMERCIAL

## 2021-05-17 ENCOUNTER — VIRTUAL VISIT (OUTPATIENT)
Dept: FAMILY MEDICINE | Facility: CLINIC | Age: 41
End: 2021-05-17
Payer: COMMERCIAL

## 2021-05-17 DIAGNOSIS — F64.0 GENDER DYSPHORIA IN ADOLESCENT AND ADULT: Primary | ICD-10-CM

## 2021-05-17 DIAGNOSIS — F64.0 GENDER DYSPHORIA IN ADOLESCENT AND ADULT: ICD-10-CM

## 2021-05-17 PROCEDURE — 99214 OFFICE O/P EST MOD 30 MIN: CPT | Mod: 95 | Performed by: FAMILY MEDICINE

## 2021-05-17 PROCEDURE — 90837 PSYTX W PT 60 MINUTES: CPT | Mod: 95 | Performed by: STUDENT IN AN ORGANIZED HEALTH CARE EDUCATION/TRAINING PROGRAM

## 2021-05-17 RX ORDER — ESTRADIOL 0.1 MG/D
2 FILM, EXTENDED RELEASE TRANSDERMAL
Qty: 16 PATCH | Refills: 3 | Status: SHIPPED | OUTPATIENT
Start: 2021-05-17 | End: 2021-08-03

## 2021-05-17 RX ORDER — FINASTERIDE 5 MG/1
1 TABLET, FILM COATED ORAL DAILY
Qty: 90 TABLET | Refills: 3 | Status: SHIPPED | OUTPATIENT
Start: 2021-05-17

## 2021-05-17 RX ORDER — SPIRONOLACTONE 100 MG/1
100 TABLET, FILM COATED ORAL DAILY
Qty: 90 TABLET | Refills: 1 | Status: SHIPPED | OUTPATIENT
Start: 2021-05-17 | End: 2021-09-14

## 2021-05-17 ASSESSMENT — ENCOUNTER SYMPTOMS
CHILLS: 0
SHORTNESS OF BREATH: 0
LIGHT-HEADEDNESS: 0
FEVER: 0

## 2021-05-17 NOTE — LETTER
May 17, 2021    Dr Italia Lopez MD  [facility] [employer].  [address] [place] [address], [address]   [place] [address]    Re: Candida Francois  YOB: 1980  MRN: [id number]      Primary letter of support for gender affirmation surgery (GAS); orchiectomy and vaginoplasty       Dear Dr Italia Lopez MD,     I am writing this letter in support of Candida Francois s intention to pursue gender affirmation surgery, orchiectomy and vaginoplasty. Candida is an 41 year old individual assigned male at birth who identifies as female who sought services through the [facility] ([facility]) Transgender Health Services to receive ongoing care for gender dysphoria and support through her gender transition.      As part of her diagnostic evaluation, Candida completed a set of psychological and gender specific measures consisting of empirically supported assessments of gender dysphoria, psychosocial adjustment, and sexual health. Candida was diagnosed with 302.85 (F64.0) Gender Dysphoria in Adolescents and Adults and she continued meeting diagnostic criteria throughout her time here.     Candida exhibited a history of gender and anatomic dysphoria related to her birth-assigned sex dating back to childhood. Candida began social gender transition on July 2020 and she has been living as female for almost 1 years. In April 2020, she began feminizing hormone therapy administered by Dr Los Beck MD at the [facility] Department of Family Medicine and Critical access hospital, [facility] and she reported no issues with adherence to medication since that time. While Candida reported great benefit from feminizing hormones, Candida greatly desires GAS to decrease her gender and anatomical dysphoria and help her being in congruence with the female gender identity presented by her. Despite the daily experience of discomfort and distress with her gender and anatomical dysphoria, she is  effectively engaged in tasks of daily life and she is a bright, resilient, and capable individual.             At this time, Candida reported that her mental health is stable and she does not present functional impairment in her daily life. The diagnostic assessment of Candida demonstrated stability of mental health as noted in her capacity to maintain regular employment, self-care, stability in relationships, and positive coping of distress related to her gender dysphoria. She is currently in individual psychotherapy sessions with Kenia Webb Jr., PhD here at the [facility], Department of Family Medicine and [facility]. In addition, Candida changed her legal name and gender ming in her documentation. I have no concerns about Candida s readiness for gender affirmation surgery.       Candida currently meets the WPATH Standards of Care for surgical gender affirmation surgery, as well as internal prerequisites/eligibility criteria for such procedures, including:        WPATH Criteria for orchiectomy and vaginoplasty surgery (two referral):     1. Persistent well-documented gender dysphoria    2. Capacity to make a fully informed decision and to consent to treatment    3. Age of majority in a given country; if minor, parental consent    4. If significant medical or mental health concerns are present, they must be reasonably well controlled.    5. 12 continuous months of hormone therapy as appropriate to the patient s gender goals (unless the patient has a medical contraindication or is otherwise unable or unwilling to take hormones).     6. 12 continuous months of living in a gender role that is congruent with their gender identity; although not an explicit criterion, it is recommended that these patients also have regular visits with a mental health or other medical professional.    Surgery to change primary and/or secondary sex characteristics is considered medically  necessary treatment in her case to address body and social dysphoria. It is imperative for her transition process, well-being, and safety. In some cases, denial of access to this surgery can lead to increased isolation, decreased work performance, and decreased sexual and interpersonal functioning. Surgery is expected to alleviate her dysphoria, increase comfort with self, and improve interpersonal, sexual, and work functioning.               Candida educated herself about the surgery and she is aware of its risks and benefits. In terms of her post-surgery aftercare, she arranged to be accompanied to surgery by her family members. She will recuperate in her home where her family members will be involved in aspects of post-operative care. She understands the time needed to recover and she will take time off work.        On behalf of our transgender health treatment team, I write this letter in support of her decision to pursue medically necessary surgery at this time. Candida demonstrated readiness for GAS and aftercare procedures. Without reservation, I support Candida in her decision to pursue GAS.       The staff s support is based on the psychological indication for surgery and did not include a physical examination to assess medical contra-indications for the surgical procedure. This letter of support is valid for one year from the date of approval.         If you have any questions, or are in need of additional information, please do not hesitate to contact me at [phone]. I am available for coordination of care and accept phone calls from the insurance company.     Sincerely,         Kenia Webb, PhD - Postdoctoral Fellow   Pronouns: they/them, he/him  [facility]  Department of Family Medicine & Community Health  [employer]  [phone]        Marian Ramirez PsyD, LMFT  Pronouns: she/her/hers   , Coordinator of the  Relationship and Sex Therapy Program  Licensed Marriage and Family Therapist  [facility]  Department of Family Medicine & Community Health  [employer] [facility] [employer]  [phone]

## 2021-05-17 NOTE — PROGRESS NOTES
The patient has been notified of following:       Patient has given verbal consent for Video visit? Yes    Patient would like the video invitation sent by: Other e-mail: Byban    Video Start Time: 11:28 AM              DELIA Tirado is a 41 year old individual that uses pronouns She/Her/Hers/Herself that presents today for follow up of:  feminizing hormone therapy.   Alone or accompanied by: accompanied today by  Gender identity: female  Started Hormone  therapy  2020  Continues on   Vivelle dot 0.1 x 2 mg patch 2x/wk, spironolactone 100 mg finasteride 5 mg  Any special concerns today?    No new concerns, hormone therapy going well  On hormones?  YES +++ Shot day of the week? Not applicable-taking pills/patch/gel      Due for labs?  Yes      +++ Refills of meds needed?  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 Does Not Apply    New health concerns since last visit:  ---fatigue, memory issues    Past Surgical History:   Procedure Laterality Date     HC TOOTH EXTRACTION W/FORCEP         Patient Active Problem List   Diagnosis     Gender dysphoria in adolescent and adult       Current Outpatient Medications   Medication Sig Dispense Refill     BIOTIN PO Take 5,000 mcg by mouth        cetirizine (ZYRTEC) 10 MG tablet Take 10 mg by mouth daily       estradiol (VIVELLE-DOT) 0.1 MG/24HR bi-weekly patch PLACE 2 PATCHES ONTO THE SKIN TWICE A WEEK 16 patch 3     finasteride (PROSCAR) 5 MG tablet TAKE 1 TABLET BY MOUTH EVERY DAY 90 tablet 3     Ginger, Zingiber officinalis, (GINGER ROOT) 550 MG CAPS capsule Take 1,100 mg by mouth daily       Hyaluronic Acid-Vitamin C (HYALURONIC ACID PO)        HYDROcodone-acetaminophen (NORCO) 5-325 MG tablet Take one tablet before electrolysis session every week.. 1 tablet 0     lidocaine-prilocaine (EMLA) 2.5-2.5 % external cream Apply 1 Dose topically as needed  Apply to affected area(s) 60 minutes before appointment. Cover area with plastic wrap       multivitamin w/minerals (MULTI-VITAMIN) tablet Take 1 tablet by mouth daily       Omega-3 Fatty Acids (FISH OIL PO) Take 1,400 mg by mouth daily        spironolactone (ALDACTONE) 100 MG tablet Take 1 tablet (100 mg) by mouth daily 30 tablet 3     Vitamin D3 (CHOLECALCIFEROL) 25 mcg (1000 units) tablet Take by mouth daily         History   Smoking Status     Former Smoker     Packs/day: 0.50     Years: 20.00     Quit date: 2017   Smokeless Tobacco     Never Used        No Known Allergies    Health Maintenance Due   Topic Date Due     MENTAL HEALTH TX PLAN  Never done         Problem, Medication and Allergy Lists were reviewed and are current..         Review of Systems:   Review of Systems   Constitutional: Negative for chills and fever.   Respiratory: Negative for shortness of breath.    Cardiovascular: Negative for chest pain and leg swelling.   Neurological: Negative for light-headedness.              Labs:   Results from last visit:  Office Visit on 04/26/2021   Component Date Value Ref Range Status     Testosterone Total 04/26/2021 9  8 - 60 ng/dL Final    Comment: This test was developed and its performance characteristics determined by the   [facility].   It has not been cleared or approved by the FDA. The laboratory is regulated   under CLIA as qualified to perform high-complexity testing. This test is used   for clinical purposes. It should not be regarded as investigational or for   research.       Sex Hormone Binding Globulin 04/26/2021 58  30 - 135 nmol/L Final     Free Testosterone Calculated 04/26/2021 0.10* 0.11 - 0.58 ng/dL Final    Comment: 18-30 Years 0.08-0.74 ng/dL  31-40 Years 0.13-0.92 ng/dL  41-51 Years 0.11-0.58 ng/dL  Postmenopausal 0.06-0.38 ng/dL       Estradiol 04/26/2021 225  pg/mL Final    Comment: Estradiol reference ranges for  "pre-menopausal  Follicular     pg/mL  Mid-cycle    pg/mL  Luteal          pg/mL             EXAM:  Had annual exam with PCP on 4/26/2021, notes reviewed, exam copied forward below:   Blood pressure 107/73, pulse 79, temperature 99  F (37.2  C), temperature source Skin, resp. rate 15, height 1.816 m (5' 11.5\"), weight 84.1 kg (185 lb 8 oz), SpO2 99 %. Body mass index is 25.51 kg/m .  Constitutional: well-appearing, appears stated age  Eyes: conjunctivae without erythema, sclera anicteric. Pupils equal, round, and reactive to light.   ENT: oropharynx clear, TM grey bilateral  Cardiac: regular rate and rhythm, normal S1/S2, no murmur/rubs/gallops  Respiratory: lungs clear to auscultation bilaterally, normal work of breathing, no wheezes/crackles  GI: abdomen soft, non-tender, non-distended  Extremities: warm and well perfused, radial pulses 2+ and equal, cap refill brisk.  Lymph: no cervical or supraclavicular lymphadenopathy  Skin: no rashes, lesions, or wounds  Psych: affect is full and appropriate, speech is fluent and non-pressured            Constitutional: healthy, alert and no distress   Psychiatric: mentation appears normal and affect normal/bright   tired  Assessment and Plan   1. Gender dysphoria  Response-: Clinically appropriate response to current hormone regimen/dose  2. Counseling regarding Covid vaccines  No labs for now   Planning consultation for bottom surgery in  , with  breast augmentation in [place] in future  Counseled regarding effectiveness and types of covid vaccines    Follow-up 4 months           Video-Visit Details    Type of service:  Video Visit    Video End Time (time video stopped): 1143    Originating Location (pt. Location): Home    Distant Location (provider location):  [facility]     Mode of Communication:  Video Conference via video platform: Rakesh Beck MD        Results by joce  Questions were elicited and " answered.     Los Beck MD

## 2021-05-18 ENCOUNTER — MYC MEDICAL ADVICE (OUTPATIENT)
Dept: FAMILY MEDICINE | Facility: CLINIC | Age: 41
End: 2021-05-18

## 2021-05-19 ENCOUNTER — VIRTUAL VISIT (OUTPATIENT)
Dept: PSYCHOLOGY | Facility: CLINIC | Age: 41
End: 2021-05-19
Payer: COMMERCIAL

## 2021-05-19 DIAGNOSIS — F64.0 GENDER DYSPHORIA IN ADOLESCENT AND ADULT: Primary | ICD-10-CM

## 2021-05-19 PROCEDURE — 99207 PR NO BILLABLE SERVICE THIS VISIT: CPT | Performed by: MARRIAGE & FAMILY THERAPIST

## 2021-05-19 PROCEDURE — 90837 PSYTX W PT 60 MINUTES: CPT | Mod: 95 | Performed by: MARRIAGE & FAMILY THERAPIST

## 2021-05-19 NOTE — LETTER
May 19, 2021     Re: Candida Francois  : 1980  Secondary letter of support for gender confirmation surgery         Dr.Heidi Crow  [address], [address]  [place]  [address]  Office phone: [phone]  Fax: [phone]     Secondary letter of support for gender affirming surgery (GAS); vaginoplasty     Dear Dr. Crow,     I am writing this letter in support of Ms. Francois s intention to pursue GAS; specifically vaginoplasty. Ms. Francois is a 41-year-old person assigned male at birth who identifies as female who has been obtaining services through the [facility] s ([facility]) Transgender Health Services since 2020. Ms. Francois sought services at [facility] to receive ongoing psychological and medical support to address her social and anatomical gender dysphoria. I have been employed as a Licensed Marriage and Family Therapist and a sex and gender specialist with the [facility] since , and have been working with transgender and gender diverse clients in the Transgender Health Services Program since that time.      Ms. Francois completed a diagnostic assessment through our clinic in 2020 with Kenia Webb, Ph.D. Through the diagnostic assessment process,  was diagnosed with 302.85 - Gender Dysphoria. She describes a history of social and anatomical gender dysphoria since early childhood. As part of her diagnostic evaluation,  completed a set of psychological and gender specific measures used in the assessment of gender dysphoria, psychosocial adjustment, and sexual health. Ms. Francois has been seen for regular bi-weekly sessions of individual psychotherapy with Dr. Webb since 2020. I met with  for one session to explore her history of gender dysphoria, goals related to transition, and plans for surgical intervention. Throughout her services at [facility], Ms. Francois has continued to meet diagnostic criteria for  Gender Dysphoria. She has been taking feminizing hormones under the supervision of Dr. Los Beck since 04/2020. She completed her legal name and gender marker change in December 2020. While Ms. Francois reports good benefit from social transition and medical transition, she continues to experience significant anatomical dysphoria.       Ms. Francois reports situational depression from time to time based on life circumstances, but no imminent issues surrounding mental health. Her situational depression has subsided completely since beginning feminizing hormones.  drinks sparingly and do not substances. At this time, her mental health symptoms are reasonably well-controlled, and there is no indication that symptoms would interfere with her ability to adhere to a post-surgical recovery plan.       Ms. Francois greatly desires GAS to decrease her gender and anatomical dysphoria.  has given her informed consent to work towards GAS, and has support and knowledge regarding the surgery and aftercare process.  has a plan for aftercare support, including appropriate time off work, as well as emotional and physical support given activity restrictions. She stated her intent to continue therapy following surgery for continued support through her transition.       currently meets the WPATH Standards of Care for GAS, as well as internal prerequisites/eligibility criteria for such procedures.On behalf of our transgender health treatment team, I write this letter in support of her decision to pursue medically necessary surgery at this time. The staff s support is based on the psychological indication for surgery and did not include a physical examination to assess medical contra-indications for the surgical procedure. This letter of support is valid for one year from the date of approval.       If you have any questions, or are in need of additional information, please do not hesitate to contact me  at [phone].      Sincerely,       Marian Ramirez PsyD, STEPHAN    [facility], [facility]  Department of Family Medicine and Community Health  [facility]

## 2021-05-19 NOTE — PROGRESS NOTES
Telemedicine Visit: The client's condition can be safely assessed and treated via synchronous audio and visual telemedicine encounter.    Reason for Telemedicine Visit: COVID-19 restrictions.  Originating Site (Client Location): Client's home  Distant Site (Provider Location): Provider Remote Setting  Consent: The client/guardian has verbally consented to: the potential risks and benefits of telemedicine (video visit) versus in person care; bill my insurance or make self-payment for services provided; and responsibility for payment of non-covered services.   Mode of Communication: Video Conference via [employer]     As the provider I attest to compliance with applicable laws and regulations related to telemedicine.  Video start time: 9:00 am  Video end time: 9:53am      [facility] -  Case Progress Note     Date of Service: 5/19/21  Client Name: Candida Francois (pronouns: she/her/her)  YOB: 1980  Age: 40 year old  MRN: [id number]  Gender/Gender Identity: Female  Treating Provider: Marian Ramirez PsyD, LMFT  Type of Session: Individual  Type of Visit: Telemedicine  Present in Session: Client only  Number of Minutes: 53 min     Current Symptoms/Status:     Gender Dysphoria: Gender and body dysphoria associated with fear and anxiety. Incongruence and distress between her biological sex and her experienced/expressed gender. Recurrent insecurities regarding her gender needs. Body and facial hair have been trigger her gender dysphoria recurrent since she has been presenting herself as woman at work. Her gender dysphoria regarding her voice has been a recurrent issue. Minority stress has been reported. In the process of healing the connection to her body. Connecting her identity with today's life has been a process.      Progress Toward Treatment Goals:   Session # 24 - Client continues to show interest and commitment towards receiving services at [facility].  Treatment plan established in  7/24/2020.  Continued working on her assignments.  Consistent progress in affirming her gender at work.  Client is committed to taking care of herself and she has maintained a solid transition plan.  Client legally changed the name and gender marker (12/03/2020).  Started hair removal and voice therapy sessions.    Intervention: Modality and Description: Therapist focused on joining and creating an alliance with the client. Explored history of gender dysphoria, goals around transition and plans for GAS. Co-wrote letter of support.    Response to Intervention:   Client was active and engaged during session.    Assignment:  None.  Interactive Complexity:  Communication difficulties present during current the psychiatric procedure include:  1. None.     Diagnosis:  302.85 (F64.0) Gender Dysphoria in Adolescents and Adults     Plan / Need for Future Services:  Return for individual therapy in two (2) weeks.      Marian Ramirez PsyD, JUANFT

## 2021-05-19 NOTE — PROGRESS NOTES
Telemedicine Visit: The client's condition can be safely assessed and treated via synchronous audio and visual telemedicine encounter.    Reason for Telemedicine Visit: COVID-19 restrictions.  Originating Site (Client Location): Client's home  Distant Site (Provider Location): Provider Remote Setting  Consent: The client/guardian has verbally consented to: the potential risks and benefits of telemedicine (video visit) versus in person care; bill my insurance or make self-payment for services provided; and responsibility for payment of non-covered services.   Mode of Communication: Video Conference via Zoom.     As the provider I attest to compliance with applicable laws and regulations related to telemedicine.  Video start time: 8:00am  Video end time: 8:53am      [facility] -  Case Progress Note     Date of Service: 5/17/21  Client Name: Candida Francois (pronouns: she/her/her)  YOB: 1980  Age: 40 year old  MRN: [id number]  Gender/Gender Identity: Female  Treating Provider: Kenia Webb, PhD, Postdoctoral Fellow  Type of Session: Individual  Type of Visit: Telemedicine  Present in Session: Client only  Number of Minutes: 53 min     Current Symptoms/Status:     Gender Dysphoria: Gender and body dysphoria associated with fear and anxiety. Incongruence and distress between her biological sex and her experienced/expressed gender. Recurrent insecurities regarding her gender needs. Body and facial hair have been trigger her gender dysphoria recurrent since she has been presenting herself as woman at work. Her gender dysphoria regarding her voice has been a recurrent issue. Minority stress has been reported. In the process of healing the connection to her body. Connecting her identity with today's life has been a process.      Progress Toward Treatment Goals:   Session # 24 - Client continues to show interest and commitment towards receiving services at [facility].  Treatment plan established  in 7/24/2020.  Continued working on her assignments.  Consistent progress in affirming her gender at work.  Client is committed to taking care of herself and she has maintained a solid transition plan.  Client legally changed the name and gender marker (12/03/2020).  Started hair removal and voice therapy sessions.    Intervention: Modality and Description:   Individual, interpersonal, CBT. The focus of today's session was on working her letter of support on gender affirmation surgery.    Response to Intervention:   Client still facing adaptation problems with the work shift. It was explored about this situation and her self-care has been kept to a minimum and negative coping has been increasing. She realized less attention in aspects of her appearance and she was reinforced to pay attention to that. Her letter was written and discussed in session. Client has been looking for a social support network to assist her in her surgery.    Assignment:  - Using hydrotherapy for improving body awareness, focus, relaxation and sensory (progressing assignment).     Interactive Complexity:  Communication difficulties present during current the psychiatric procedure include:  1. None.     Diagnosis:  302.85 (F64.0) Gender Dysphoria in Adolescents and Adults     Plan / Need for Future Services:  Return for individual therapy in two (2) weeks.      Kenia Webb, PhD, Postdoctoral Fellow

## 2021-05-21 DIAGNOSIS — F64.0 GENDER DYSPHORIA IN ADOLESCENT AND ADULT: ICD-10-CM

## 2021-05-21 RX ORDER — HYDROCODONE BITARTRATE AND ACETAMINOPHEN 5; 325 MG/1; MG/1
TABLET ORAL
Qty: 1 TABLET | Refills: 0 | Status: SHIPPED | OUTPATIENT
Start: 2021-05-21

## 2021-05-21 NOTE — CONFIDENTIAL NOTE
I have queried the [place] Prescription Monitoring Program for this patient to determine if they are an appropriate candidate for a controlled substance prescription. There is no evidence of prescription misuse based on this  search.    Ivan Rodriguez MD on 5/21/2021 at 5:36 PM

## 2021-06-01 ENCOUNTER — VIRTUAL VISIT (OUTPATIENT)
Dept: PSYCHOLOGY | Facility: CLINIC | Age: 41
End: 2021-06-01
Payer: COMMERCIAL

## 2021-06-01 DIAGNOSIS — F64.0 GENDER DYSPHORIA IN ADOLESCENT AND ADULT: Primary | ICD-10-CM

## 2021-06-01 PROCEDURE — 90837 PSYTX W PT 60 MINUTES: CPT | Mod: 95 | Performed by: STUDENT IN AN ORGANIZED HEALTH CARE EDUCATION/TRAINING PROGRAM

## 2021-06-02 NOTE — PROGRESS NOTES
Telemedicine Visit: The client's condition can be safely assessed and treated via synchronous audio and visual telemedicine encounter.    Reason for Telemedicine Visit: COVID-19 restrictions.  Originating Site (Client Location): Client's home  Distant Site (Provider Location): Provider Remote Setting  Consent: The client/guardian has verbally consented to: the potential risks and benefits of telemedicine (video visit) versus in person care; bill my insurance or make self-payment for services provided; and responsibility for payment of non-covered services.   Mode of Communication: Video Conference via Zoom.     As the provider I attest to compliance with applicable laws and regulations related to telemedicine.  Video start time: 6:00pm  Video end time: 6:53pm      [facility] -  Case Progress Note     Date of Service: 6/01/21  Client Name: Candida Francois (pronouns: she/her/her)  YOB: 1980  Age: 40 year old  MRN: [id number]  Gender/Gender Identity: Female  Treating Provider: Kenia Webb, PhD, Postdoctoral Fellow  Type of Session: Individual  Type of Visit: Telemedicine  Present in Session: Client only  Number of Minutes: 53 min     Current Symptoms/Status:     Gender Dysphoria: Gender and body dysphoria associated with fear and anxiety. Incongruence and distress between her biological sex and her experienced/expressed gender. Recurrent insecurities regarding her gender needs. Body and facial hair have been trigger her gender dysphoria recurrent since she has been presenting herself as woman at work. Her gender dysphoria regarding her voice has been a recurrent issue. Minority stress has been reported. In the process of healing the connection to her body. Connecting her identity with today's life has been a process. She has decreased her self-care due to work-related stressors.      Progress Toward Treatment Goals:   Session # 25 - Client continues to show interest and commitment  towards receiving services at [facility].  Treatment plan established in 7/24/2020.  Continued working on her assignments.  Consistent progress in affirming her gender at work.  Client is committed to taking care of herself and she has maintained a solid transition plan.  Client legally changed the name and gender marker (12/03/2020).  Started hair removal and voice therapy sessions.    Intervention: Modality and Description:   Individual, interpersonal, CBT. The focus of today's session was on working her plan for next 2 years.    Response to Intervention:   Client processed her medical transition plan for the next two years and she observed a good balance for the process to not create complications for her personal life. The discussion also addressed how she has been dealing in her work with other people who also face difficulties related to gender dysphoria. Basic care was again reinforced so that she is in contact with the needs of her body and identity. Client was engaged and less anxious about the course of action.    Assignment:  - Using hydrotherapy for improving body awareness, focus, relaxation and sensory (permanent assignment, less consistent).     Interactive Complexity:  Communication difficulties present during current the psychiatric procedure include:  1. None.     Diagnosis:  302.85 (F64.0) Gender Dysphoria in Adolescents and Adults     Plan / Need for Future Services:  Return for individual therapy in two (2) weeks.      Kenia Webb, PhD, Postdoctoral Fellow

## 2021-06-15 ENCOUNTER — VIRTUAL VISIT (OUTPATIENT)
Dept: PSYCHOLOGY | Facility: CLINIC | Age: 41
End: 2021-06-15
Payer: COMMERCIAL

## 2021-06-15 DIAGNOSIS — F64.0 GENDER DYSPHORIA IN ADOLESCENT AND ADULT: Primary | ICD-10-CM

## 2021-06-15 PROCEDURE — 90837 PSYTX W PT 60 MINUTES: CPT | Mod: 95 | Performed by: STUDENT IN AN ORGANIZED HEALTH CARE EDUCATION/TRAINING PROGRAM

## 2021-06-15 PROCEDURE — 99207 PR NO BILLABLE SERVICE THIS VISIT: CPT | Performed by: STUDENT IN AN ORGANIZED HEALTH CARE EDUCATION/TRAINING PROGRAM

## 2021-06-16 NOTE — PROGRESS NOTES
Telemedicine Visit: The client's condition can be safely assessed and treated via synchronous audio and visual telemedicine encounter.    Reason for Telemedicine Visit: COVID-19 restrictions.  Originating Site (Client Location): Client's home  Distant Site (Provider Location): Provider Remote Setting  Consent: The client/guardian has verbally consented to: the potential risks and benefits of telemedicine (video visit) versus in person care; bill my insurance or make self-payment for services provided; and responsibility for payment of non-covered services.   Mode of Communication: Video Conference via Zoom.     As the provider I attest to compliance with applicable laws and regulations related to telemedicine.  Video start time: 6:00pm  Video end time: 6:53pm      [facility] -  Case Progress Note     Date of Service: 6/15/21  Client Name: Candida Francois (pronouns: she/her/her)  YOB: 1980  Age: 40 year old  MRN: [id number]  Gender/Gender Identity: Female  Treating Provider: Kenia Webb, PhD, Postdoctoral Fellow  Type of Session: Individual  Type of Visit: Telemedicine  Present in Session: Client only  Number of Minutes: 53 min     Current Symptoms/Status:     Gender Dysphoria: Gender and body dysphoria associated with fear and anxiety. Incongruence and distress between her biological sex and her experienced/expressed gender. Recurrent insecurities regarding her gender needs. Body and facial hair have been trigger her gender dysphoria recurrent since she has been presenting herself as woman at work. Her gender dysphoria regarding her voice has been a recurrent issue. Minority stress has been reported. In the process of healing the connection to her body. Connecting her identity with today's life has been a process. She has decreased her self-care due to work-related stressors.     Progress Toward Treatment Goals:   Session # 26 - Client continues to show interest and commitment  towards receiving services at [facility].  Treatment plan established in 7/24/2020.  Continued working on her assignments.  Consistent progress in affirming her gender at work.  Client is committed to taking care of herself and she has maintained a solid transition plan.  Client legally changed the name and gender marker (12/03/2020).  Started hair removal and voice therapy sessions.    Intervention: Modality and Description:   Individual, interpersonal, CBT. The focus of today's session was on working her plan for next 2 years.    Response to Intervention:   Client celebrated the connection she has had with her father and the mutual respect and support that he has been providing her about the transition and future steps. She recap the upcoming session to define her surgery to be able to establish clearer steps on the changes she will need to make. Client has returned to having a consistency in self-care, being reinforced about these attitudes. He has had a social support role for one of her co-workers regarding their transition, this has been good for her in identity terms.    Assignment:  - Using hydrotherapy for improving body awareness, focus, relaxation and sensory (permanent assignment, less consistent).     Interactive Complexity:  Communication difficulties present during current the psychiatric procedure include:  1. None.     Diagnosis:  302.85 (F64.0) Gender Dysphoria in Adolescents and Adults     Plan / Need for Future Services:  Return for individual therapy in two (2) weeks.      Kenia Webb, PhD, Postdoctoral Fellow

## 2021-06-29 ENCOUNTER — VIRTUAL VISIT (OUTPATIENT)
Dept: PSYCHOLOGY | Facility: CLINIC | Age: 41
End: 2021-06-29
Payer: COMMERCIAL

## 2021-06-29 DIAGNOSIS — F64.0 GENDER DYSPHORIA IN ADOLESCENT AND ADULT: Primary | ICD-10-CM

## 2021-06-29 PROCEDURE — 90837 PSYTX W PT 60 MINUTES: CPT | Mod: 95 | Performed by: STUDENT IN AN ORGANIZED HEALTH CARE EDUCATION/TRAINING PROGRAM

## 2021-06-29 PROCEDURE — 99207 PR NO BILLABLE SERVICE THIS VISIT: CPT | Performed by: STUDENT IN AN ORGANIZED HEALTH CARE EDUCATION/TRAINING PROGRAM

## 2021-07-01 NOTE — PROGRESS NOTES
Telemedicine Visit: The client's condition can be safely assessed and treated via synchronous audio and visual telemedicine encounter.    Reason for Telemedicine Visit: COVID-19 restrictions.  Originating Site (Client Location): Client's home  Distant Site (Provider Location): Provider Remote Setting  Consent: The client/guardian has verbally consented to: the potential risks and benefits of telemedicine (video visit) versus in person care; bill my insurance or make self-payment for services provided; and responsibility for payment of non-covered services.   Mode of Communication: Video Conference via Zoom.     As the provider I attest to compliance with applicable laws and regulations related to telemedicine.  Video start time: 6:00pm  Video end time: 6:53pm      [facility] -  Case Progress Note     Date of Service: 6/29/21  Client Name: Candida Francois (pronouns: she/her/her)  YOB: 1980  Age: 40 year old  MRN: [id number]  Gender/Gender Identity: Female  Treating Provider: Kenia Webb, PhD, Postdoctoral Fellow  Type of Session: Individual  Type of Visit: Telemedicine  Present in Session: Client only  Number of Minutes: 53 min     Current Symptoms/Status:     Gender Dysphoria: Gender and body dysphoria associated with fear and anxiety. Incongruence and distress between her biological sex and her experienced/expressed gender. Body and facial hair have been trigger her gender dysphoria recurrent since she has been presenting herself as woman at work. Her gender dysphoria regarding her voice has been a recurrent issue. Minority stress has been reported. In the process of healing the connection to her body. Connecting her identity with today's life has been a process. She has decreased her self-care due to work-related stressors.     Progress Toward Treatment Goals:   Session # 27 - Client continues to show interest and commitment towards receiving services at [facility].  Treatment plan  established in 7/24/2020.  Continued working on her assignments.  Consistent progress in affirming her gender at work.  Client is committed to taking care of herself and she has maintained a solid transition plan.  Client legally changed the name and gender marker (12/03/2020).  Started hair removal and voice therapy sessions.    Intervention: Modality and Description:   Individual, interpersonal, CBT. The focus of today's session was on working her plan for next 2 years (continue).    Response to Intervention:   Client has chosen to prioritize her surgical needs, despite her regret that this will affect her work. It's likely that she'll have to relocate to another state, as she's been discussing it at work and getting support from her father for guidance on housing and other matters. She explored the feelings and emotions about these changes. She acknowledged that this is not a burden on her. She has trying to integrate the transition with other life changes she wishes to make.    Assignment:  - Using hydrotherapy for improving body awareness, focus, relaxation and sensory (permanent assignment, less consistent).     Interactive Complexity:  Communication difficulties present during current the psychiatric procedure include:  1. None.     Diagnosis:  302.85 (F64.0) Gender Dysphoria in Adolescents and Adults     Plan / Need for Future Services:  Return for individual therapy in two (2) weeks.      Kenia Webb, PhD, Postdoctoral Fellow

## 2021-08-03 ENCOUNTER — TELEPHONE (OUTPATIENT)
Dept: FAMILY MEDICINE | Facility: CLINIC | Age: 41
End: 2021-08-03

## 2021-08-03 ENCOUNTER — VIRTUAL VISIT (OUTPATIENT)
Dept: PSYCHOLOGY | Facility: CLINIC | Age: 41
End: 2021-08-03
Payer: COMMERCIAL

## 2021-08-03 DIAGNOSIS — F64.0 GENDER DYSPHORIA IN ADOLESCENT AND ADULT: Primary | ICD-10-CM

## 2021-08-03 DIAGNOSIS — F64.0 GENDER DYSPHORIA IN ADOLESCENT AND ADULT: ICD-10-CM

## 2021-08-03 PROCEDURE — 99207 PR NO BILLABLE SERVICE THIS VISIT: CPT | Performed by: STUDENT IN AN ORGANIZED HEALTH CARE EDUCATION/TRAINING PROGRAM

## 2021-08-03 PROCEDURE — 90837 PSYTX W PT 60 MINUTES: CPT | Mod: 95 | Performed by: STUDENT IN AN ORGANIZED HEALTH CARE EDUCATION/TRAINING PROGRAM

## 2021-08-03 RX ORDER — ESTRADIOL 0.1 MG/D
2 FILM, EXTENDED RELEASE TRANSDERMAL
Qty: 52 PATCH | Refills: 0 | Status: SHIPPED | OUTPATIENT
Start: 2021-08-05

## 2021-08-03 NOTE — TELEPHONE ENCOUNTER
Checked pt chart - finasteride script filled by Dr Beck for 90 days in May, with a year's refills  Pt does need some labs for further refills of estradiol - 90 day supply of estradiol recently sent to pharmacy by Dr Rodriguez  Sent [employer] message to pt - Asked pt to contact Dr Beck's office if questions about finasteride.Did let pt know some labs are needed to continue estradiol refills - has upcoming appt with Dr Beck, and can discuss any lab needs with that provider   Pt to call back with any concerns.  Tami Head RN  [facility]

## 2021-08-03 NOTE — TELEPHONE ENCOUNTER
Who is calling? Patient  Medication name: finasteride (PROSCAR) 5 MG tablet  Is this a refill request? Yes  Have they contacted the pharmacy?  Yes  Pharmacy:   [id number] [employer] - [place], [place] - [address]  [address] [facility] [address]  Phone: [phone] Fax: [phone]  Question/Concern: Patient states she should have more medication left because she was supposed to have 90 days worth of refills. She has called the pharmacy but wants a RN to look into this as well.   Would patient like a call back? Yes if needed.     Patient also requests clarification for her estradiol refill. Was told by RN that she needed to come in for her physical for further refills but she was here in April 2021.

## 2021-08-03 NOTE — TELEPHONE ENCOUNTER
Last Office Visit: 10/23/20  Future [facility] Appointments: None  Medication last refilled: 5/17/21 #16 with 3 refill(s)    Routing refill request to provider for review/approval because:  High dose warning    Medication is being filled for 1 time refill only due to:  Patient needs labs CMP, CBC, Lipid panel. Future labs ordered no. Patient needs to be seen because due for annual exam.    [employer] message sent to patient to call and make an annual exam appointment.    Annmarie Carvalho RN, BSN

## 2021-08-04 NOTE — PROGRESS NOTES
Telemedicine Visit: The client's condition can be safely assessed and treated via synchronous audio and visual telemedicine encounter.    Reason for Telemedicine Visit: COVID-19 restrictions.  Originating Site (Client Location): Client's home  Distant Site (Provider Location): Provider Remote Setting  Consent: The client/guardian has verbally consented to: the potential risks and benefits of telemedicine (video visit) versus in person care; bill my insurance or make self-payment for services provided; and responsibility for payment of non-covered services.   Mode of Communication: Video Conference via Zoom.     As the provider I attest to compliance with applicable laws and regulations related to telemedicine.  Video start time: 7:00pm  Video end time: 7:53pm      [place] -  Case Progress Note     Date of Service: 8/03/21  Client Name: Candida Francois (pronouns: she/her/her)  YOB: 1980  Age: 40 year old  MRN: [id number]  Gender/Gender Identity: Female  Treating Provider: Kenia Webb, PhD, Postdoctoral Fellow  Type of Session: Individual  Type of Visit: Telemedicine  Present in Session: Client only  Number of Minutes: 53 min     Current Symptoms/Status:     Gender Dysphoria: Gender and body dysphoria associated with fear and anxiety. Incongruence and distress between her biological sex and her experienced/expressed gender. Body and facial hair have been trigger her gender dysphoria recurrent since she has been presenting herself as woman at work. Her gender dysphoria regarding her voice has been a recurrent issue. Minority stress has been reported. In the process of healing the connection to her body. Connecting her identity with today's life has been a process.     Progress Toward Treatment Goals:   Session # 28 - Client continues to show interest and commitment towards receiving services at [facility].  Treatment plan established in 7/24/2020.  Continued working on her  assignments.  Consistent progress in affirming her gender at work.  Client is committed to taking care of herself and she has maintained a solid transition plan.  Client legally changed the name and gender marker (12/03/2020).  Started hair removal and voice therapy sessions.    Intervention: Modality and Description:   Individual, interpersonal, CBT. The focus of today's session was on updating her current situation.    Response to Intervention:   Client reframed her current work circumstance, which resulted in her termination. She understood the unfulfilled expectations placed on her and was able to see the incident positively in order to invest her energy in surgical interventions to aid in her transition.    Assignment:  - Using hydrotherapy for improving body awareness, focus, relaxation and sensory (suspended assignment).     Interactive Complexity:  Communication difficulties present during current the psychiatric procedure include:  1. None.     Diagnosis:  302.85 (F64.0) Gender Dysphoria in Adolescents and Adults     Plan / Need for Future Services:  Return for individual therapy in one (1) week.      Kenia Webb, PhD, Postdoctoral Fellow

## 2021-08-12 ENCOUNTER — VIRTUAL VISIT (OUTPATIENT)
Dept: PSYCHOLOGY | Facility: CLINIC | Age: 41
End: 2021-08-12
Payer: COMMERCIAL

## 2021-08-12 DIAGNOSIS — F64.0 GENDER DYSPHORIA IN ADOLESCENT AND ADULT: Primary | ICD-10-CM

## 2021-08-12 PROCEDURE — 90837 PSYTX W PT 60 MINUTES: CPT | Mod: 95 | Performed by: STUDENT IN AN ORGANIZED HEALTH CARE EDUCATION/TRAINING PROGRAM

## 2021-08-12 PROCEDURE — 99207 PR NO BILLABLE SERVICE THIS VISIT: CPT | Performed by: STUDENT IN AN ORGANIZED HEALTH CARE EDUCATION/TRAINING PROGRAM

## 2021-08-13 NOTE — PROGRESS NOTES
Telemedicine Visit: The client's condition can be safely assessed and treated via synchronous audio and visual telemedicine encounter.    Reason for Telemedicine Visit: COVID-19 restrictions.  Originating Site (Client Location): Client's home  Distant Site (Provider Location): Provider Remote Setting  Consent: The client/guardian has verbally consented to: the potential risks and benefits of telemedicine (video visit) versus in person care; bill my insurance or make self-payment for services provided; and responsibility for payment of non-covered services.   Mode of Communication: Video Conference via Zoom.     As the provider I attest to compliance with applicable laws and regulations related to telemedicine.  Video start time: 7:00pm  Video end time: 7:53pm      [facility] -  Case Progress Note     Date of Service: 8/12/21  Client Name: Candida Francois (pronouns: she/her/her)  YOB: 1980  Age: 40 year old  MRN: [id number]  Gender/Gender Identity: Female  Treating Provider: Kenia Webb, PhD, Postdoctoral Fellow  Type of Session: Individual  Type of Visit: Telemedicine  Present in Session: Client only  Number of Minutes: 53 min     Current Symptoms/Status:     Gender Dysphoria: Gender and body dysphoria associated with fear and anxiety. Incongruence and distress between her biological sex and her experienced/expressed gender. Her gender dysphoria regarding her voice has been a recurrent issue. Minority stress has been reported. In the process of healing the connection to her body. Connecting her identity with today's life has been a process.     Progress Toward Treatment Goals:   Session # 29 - Client continues to show interest and commitment towards receiving services at [facility].  Treatment plan established in 7/24/2020.  Continued working on her assignments.  Consistent progress in affirming her gender at work.  Client is committed to taking care of herself and she has maintained a  solid transition plan.  Client legally changed the name and gender marker (12/03/2020).  Started hair removal and voice therapy sessions.    Intervention: Modality and Description:   Individual, interpersonal, CBT. The focus of today's session was on reconnecting her personal life.    Response to Intervention:   Client discussed her feelings about reconnecting with and investing in social interactions. She recognized the value of simple, everyday tasks and she has been planing to move her life to another state. Attitudes and behaviors were reinforced.    Assignment:  - Using hydrotherapy for improving body awareness, focus, relaxation and sensory (suspended assignment).     Interactive Complexity:  Communication difficulties present during current the psychiatric procedure include:  1. None.     Diagnosis:  302.85 (F64.0) Gender Dysphoria in Adolescents and Adults     Plan / Need for Future Services:  Return for individual therapy in one (1) week.      Kenia Webb, PhD, Postdoctoral Fellow       Normal Normal Normal

## 2021-08-17 ENCOUNTER — VIRTUAL VISIT (OUTPATIENT)
Dept: PSYCHOLOGY | Facility: CLINIC | Age: 41
End: 2021-08-17
Payer: COMMERCIAL

## 2021-08-17 DIAGNOSIS — F64.0 GENDER DYSPHORIA IN ADOLESCENT AND ADULT: Primary | ICD-10-CM

## 2021-08-17 PROCEDURE — 90837 PSYTX W PT 60 MINUTES: CPT | Mod: 95 | Performed by: STUDENT IN AN ORGANIZED HEALTH CARE EDUCATION/TRAINING PROGRAM

## 2021-08-17 PROCEDURE — 99207 PR NO BILLABLE SERVICE THIS VISIT: CPT | Performed by: STUDENT IN AN ORGANIZED HEALTH CARE EDUCATION/TRAINING PROGRAM

## 2021-08-18 NOTE — PROGRESS NOTES
Telemedicine Visit: The client's condition can be safely assessed and treated via synchronous audio and visual telemedicine encounter.    Reason for Telemedicine Visit: COVID-19 restrictions.  Originating Site (Client Location): Client's home  Distant Site (Provider Location): Provider Remote Setting  Consent: The client/guardian has verbally consented to: the potential risks and benefits of telemedicine (video visit) versus in person care; bill my insurance or make self-payment for services provided; and responsibility for payment of non-covered services.   Mode of Communication: Video Conference via Zoom.     As the provider I attest to compliance with applicable laws and regulations related to telemedicine.  Video start time: 7:00pm  Video end time: 7:53pm      [facility] -  Case Progress Note     Date of Service: 8/17/21  Client Name: Candida Francois (pronouns: she/her/her)  YOB: 1980  Age: 40 year old  MRN: [id number]  Gender/Gender Identity: Female  Treating Provider: Kenia Webb, PhD, Postdoctoral Fellow  Type of Session: Individual  Type of Visit: Telemedicine  Present in Session: Client only  Number of Minutes: 53 min     Current Symptoms/Status:     Gender Dysphoria: Gender and body dysphoria associated with fear and anxiety. Incongruence and distress between her biological sex and her experienced/expressed gender. Her gender dysphoria regarding her voice has been a recurrent issue. In the process of healing the connection to her body. Connecting her identity with today's life has been a process.     Progress Toward Treatment Goals:   Session # 30 - Client continues to show interest and commitment towards receiving services at [facility].  Treatment plan established in 7/24/2020.  Continued working on her assignments.  Consistent progress in affirming her gender at work.  Client is committed to taking care of herself and she has maintained a solid transition plan.  Client  legally changed the name and gender marker (12/03/2020).  Started hair removal and voice therapy sessions.    Intervention: Modality and Description:   Individual, interpersonal, CBT. The focus of today's session was on reconnecting her personal life (continue).    Response to Intervention:   Client has demonstrated consistency in terms of body care and interpersonal interactions. She recognized that these aspects need to be kept in her future plan. Plans for a change of state have progressed more quickly than she anticipated. Her time has been devoted to family and friends and these social interactions have aided in the establishment of her gender identity.    Assignment:  No assignments was address today.     Interactive Complexity:  Communication difficulties present during current the psychiatric procedure include:  1. None.     Diagnosis:  302.85 (F64.0) Gender Dysphoria in Adolescents and Adults     Plan / Need for Future Services:  Return for individual therapy in one (1) week.      Kenia Webb, PhD, Postdoctoral Fellow

## 2021-08-23 ENCOUNTER — VIRTUAL VISIT (OUTPATIENT)
Dept: PSYCHOLOGY | Facility: CLINIC | Age: 41
End: 2021-08-23
Payer: COMMERCIAL

## 2021-08-23 DIAGNOSIS — F64.0 GENDER DYSPHORIA IN ADOLESCENT AND ADULT: Primary | ICD-10-CM

## 2021-08-23 PROCEDURE — 99207 PR NO BILLABLE SERVICE THIS VISIT: CPT | Performed by: STUDENT IN AN ORGANIZED HEALTH CARE EDUCATION/TRAINING PROGRAM

## 2021-08-23 PROCEDURE — 90834 PSYTX W PT 45 MINUTES: CPT | Mod: 95 | Performed by: STUDENT IN AN ORGANIZED HEALTH CARE EDUCATION/TRAINING PROGRAM

## 2021-08-24 NOTE — PROGRESS NOTES
Telemedicine Visit: The client's condition can be safely assessed and treated via synchronous audio and visual telemedicine encounter.    Reason for Telemedicine Visit: COVID-19 restrictions.  Originating Site (Client Location): Client's home  Distant Site (Provider Location): Provider Remote Setting  Consent: The client/guardian has verbally consented to: the potential risks and benefits of telemedicine (video visit) versus in person care; bill my insurance or make self-payment for services provided; and responsibility for payment of non-covered services.   Mode of Communication: Video Conference via Zoom.     As the provider I attest to compliance with applicable laws and regulations related to telemedicine.  Video start time: 7:05pm  Video end time: 7:35pm      [facility] -  Case Progress Note     Date of Service: 8/23/21  Client Name: Candida Francois (pronouns: she/her/her)  YOB: 1980  Age: 40 year old  MRN: [id number]  Gender/Gender Identity: Female  Treating Provider: Kenia Webb, PhD, Postdoctoral Fellow  Type of Session: Individual  Type of Visit: Telemedicine  Present in Session: Client only  Number of Minutes: 30 min     Current Symptoms/Status:     Gender Dysphoria: Gender and body dysphoria associated with fear and anxiety. Incongruence and distress between her biological sex and her experienced/expressed gender. Her gender dysphoria regarding her voice has been a recurrent issue. In the process of healing the connection to her body. Connecting her identity with today's life has been a process.     Progress Toward Treatment Goals:   Session # 31 - Client continues to show interest and commitment towards receiving services at [facility].  Treatment plan established in 7/24/2020.  Continued working on her assignments.  Consistent progress in affirming her gender at work.  Client is committed to taking care of herself and she has maintained a solid transition plan.  Client  legally changed the name and gender marker (12/03/2020).  Started hair removal and voice therapy sessions.    Intervention: Modality and Description:   Individual, interpersonal, CBT. The focus of today's session was on planing her transfer and future plans.     Response to Intervention:   Client exemplified instances of social reinforcement of her gender identity and attempted to recall her actions and attitudes. She noted a lack of social skills in some of her encounters with her brother and noted it for future meets. She summarized her plans for undertaking the medical transition process and detailed how it will proceed during her [place] cycle. Client also reviewed his future plans in personal projects and possible developments in future psychotherapy sessions.    Assignment:  No assignments was address today.     Interactive Complexity:  Communication difficulties present during current the psychiatric procedure include:  1. None.     Diagnosis:  302.85 (F64.0) Gender Dysphoria in Adolescents and Adults     Plan / Need for Future Services:  Return for individual therapy in one (1) week.      Kenia Webb, PhD, Postdoctoral Fellow

## 2021-09-07 NOTE — PROGRESS NOTES
AUDIOLOGY REPORT    SUBJECTIVE:  Candida Francois, age 41, was seen on 9/16/21 in the Audiology Clinic at the [facility] for audiologic evaluation, referred by Ivan Rodriguez MD.  The patient reports long-standing bilateral tinnitus, which has historically not been bothersome.  The tinnitus become louder in the last month after moving in with father, who has a loud TV.  The patient has often been using noise earmuffs as a result.  Patient is also not getting good sleep.  Candida denies hearing changes, dizziness, ear pain, aural fullness, drainage from the ears, or history of ear surgery.      OBJECTIVE:    Otoscopic exam indicates ears are clear of cerumen bilaterally.     Pure Tone Thresholds assessed using conventional audiometry with good reliability from 250-8000 Hz bilaterally using insert earphones and circumaural headphones.      RIGHT:  Normal hearing    LEFT:  Normal hearing    Tympanogram:    RIGHT: normal eardrum mobility    LEFT:   normal eardrum mobility    Reflexes (reported by stimulus ear):  RIGHT: Ipsilateral is present at normal levels  RIGHT: Contralateral is present at normal levels  LEFT:   Ipsilateral is present at normal levels  LEFT:   Contralateral is present at normal levels      Speech Reception Threshold:    RIGHT: 10 dB HL    LEFT:   10 dB HL  Word Recognition Score:     RIGHT: 100% at 50 dB HL using NU-6 recorded word list.    LEFT:   100% at 50 dB HL using NU-6 recorded word list.    ASSESSMENT:   Normal hearing bilaterally  Normal middle ear function bilaterally  Bilateral tinnitus, recent worsening after moving in with father    Today s results were discussed with the patient in detail.     PLAN:  We discussed that noise, earplugs, and lack of sleep can contribute to tinnitus.  Candida is moving out of state in the next week and will no longer live with father.  If tinnitus concerns continue after the patient is no longer exposed  to the loud TV, after the patient is no longer needing noise earmuffs and after the patient is getting more sleep, Candida should have an ENT consultation and should consider a tinnitus evaluation.  The patient will follow up with Dr. Rodriguez regarding today's results and recommendations.  Dr. Rodriguez can also weigh in on if any of the patient's medications may contribute to tinnitus.     The patient expressed understanding and agreement with this plan.    Shemar Rogers, CCC-A, [facility]  Licensed Audiologist  [place] #[id number]    Enclosure: audiogram    Cc Ivan Rodriguez MD

## 2021-09-14 ENCOUNTER — VIRTUAL VISIT (OUTPATIENT)
Dept: FAMILY MEDICINE | Facility: CLINIC | Age: 41
End: 2021-09-14
Payer: COMMERCIAL

## 2021-09-14 DIAGNOSIS — F64.0 GENDER DYSPHORIA IN ADOLESCENT AND ADULT: Primary | ICD-10-CM

## 2021-09-14 PROCEDURE — 99214 OFFICE O/P EST MOD 30 MIN: CPT | Mod: 95 | Performed by: FAMILY MEDICINE

## 2021-09-14 RX ORDER — SPIRONOLACTONE 100 MG/1
100 TABLET, FILM COATED ORAL DAILY
Qty: 90 TABLET | Refills: 1
Start: 2021-09-14 | End: 2021-12-24

## 2021-09-14 NOTE — PROGRESS NOTES
"The patient has been notified of following:       Patient has given verbal consent for Video visit? Yes    Patient would like the video invitation sent by: Other e-mail: [email or web address]    Video Start Time: 8:01 AM              DELIA Tirado is a 41 year old individual that uses pronouns She/Her/Hers/Herself that presents today for follow up of:  feminizing hormone therapy.   Alone or accompanied by: accompanied today by  Gender identity: female  Started Hormone  therapy  2020  Continues on Vivelle dot 0.1 x 2 mg patch 2x/wk, Spironlactone 100* mg daily  and Other 5*.   Any special concerns today?    1. Patient had hair transplant   2. Bottom surgery scheduled 5/10/2022, moving to [place] for next 1-2 years to prepare for surgery and for mother to help her with post op care. Will be moving next Tuesday.      On hormones?  YES +++ Shot day of the week? Not applicable-taking pills/patch/gel      Due for labs?  Yes      +++ Refills of meds needed?  Yes  Gender related body changes since last visit:   Breast 13 1/2 x x 13\" measured recently  Planning some fat transfer sessions to increase bust size      Breakthrough bleeding? Does Not Apply    New health concerns since last visit:  ---none    Past Surgical History:   Procedure Laterality Date     HC TOOTH EXTRACTION W/FORCEP         Patient Active Problem List   Diagnosis     Gender dysphoria in adolescent and adult       Current Outpatient Medications   Medication Sig Dispense Refill     BIOTIN PO Take 5,000 mcg by mouth        cetirizine (ZYRTEC) 10 MG tablet Take 10 mg by mouth daily       estradiol (VIVELLE-DOT) 0.1 MG/24HR bi-weekly patch PLACE 2 PATCHES ONTO THE SKIN TWICE A WEEK 52 patch 0     finasteride (PROSCAR) 5 MG tablet Take 1 tablet (5 mg) by mouth daily 90 tablet 3     Shae, Zingiber officinalis, (SHAE ROOT) 550 MG CAPS capsule Take 1,100 mg by mouth daily       Hyaluronic Acid-Vitamin C (HYALURONIC ACID PO)        HYDROcodone-acetaminophen (NORCO) 5-325 MG " tablet Take one tablet before electrolysis session every week.. 1 tablet 0     lidocaine-prilocaine (EMLA) 2.5-2.5 % external cream Apply 1 Dose topically as needed Apply to affected area(s) 60 minutes before appointment. Cover area with plastic wrap       multivitamin w/minerals (MULTI-VITAMIN) tablet Take 1 tablet by mouth daily       Omega-3 Fatty Acids (FISH OIL PO) Take 1,400 mg by mouth daily        spironolactone (ALDACTONE) 100 MG tablet Take 1 tablet (100 mg) by mouth daily 90 tablet 1     Vitamin D3 (CHOLECALCIFEROL) 25 mcg (1000 units) tablet Take by mouth daily         History   Smoking Status     Former Smoker     Packs/day: 0.50     Years: 20.00     Quit date: 2017   Smokeless Tobacco     Never Used        No Known Allergies    Health Maintenance Due   Topic Date Due     MENTAL HEALTH TX PLAN  Never done         Problem, Medication and Allergy Lists were reviewed and are current..         Review of Systems:   Review of Systems           Labs:   Results from last visit:  Office Visit on 04/26/2021   Component Date Value Ref Range Status     Testosterone Total 04/26/2021 9  8 - 60 ng/dL Final    Comment: This test was developed and its performance characteristics determined by the   [facility].   It has not been cleared or approved by the FDA. The laboratory is regulated   under CLIA as qualified to perform high-complexity testing. This test is used   for clinical purposes. It should not be regarded as investigational or for   research.       Sex Hormone Binding Globulin 04/26/2021 58  30 - 135 nmol/L Final     Free Testosterone Calculated 04/26/2021 0.10* 0.11 - 0.58 ng/dL Final    Comment: 18-30 Years 0.08-0.74 ng/dL  31-40 Years 0.13-0.92 ng/dL  41-51 Years 0.11-0.58 ng/dL  Postmenopausal 0.06-0.38 ng/dL       Estradiol 04/26/2021 225  pg/mL Final    Comment: Estradiol reference ranges for pre-menopausal  Follicular      pg/mL  Mid-cycle    pg/mL  Luteal          pg/mL             EXAM:  Constitutional: healthy, alert and no distress   Psychiatric: mentation appears normal and affect normal/bright     Assessment and Plan   1. Gender dysphoria  Clinically appropriate response to current hormone regimen  Counseled patient regarding plans for pre and post op cares:  Recommend find hormone care provider in [place], suggest [facility] there  Labs: estradiol, BMP, and Lipids  if possible prior to moving  Reduce dose estrogen to 0.05 mg patch 2x/wk or 2 mg oral daily 2 weeks prior to surgery   Once regularly mobile post op, start TD estradiol, 0.1 mg patch 2x/wk; would repeat estradiol level   Given planing more breast growth and less than 2 years on hormones may need higher dose  Discussed breast care.    Follow-up when back in [place]    35 minutes spent on the date of the encounter doing chart review, patient visit and documentation            Video-Visit Details    Type of service:  Video Visit    Video End Time (time video stopped): 833    Originating Location (pt. Location): Home    Distant Location (provider location):  [facility]     Mode of Communication:  Video Conference via video platform: Rakesh Beck MD        Results by [facility]  Questions were elicited and answered.     Los Beck MD

## 2021-09-16 ENCOUNTER — OFFICE VISIT (OUTPATIENT)
Dept: AUDIOLOGY | Facility: CLINIC | Age: 41
End: 2021-09-16
Attending: FAMILY MEDICINE
Payer: COMMERCIAL

## 2021-09-16 DIAGNOSIS — H93.13 TINNITUS OF BOTH EARS: Primary | ICD-10-CM

## 2021-09-16 DIAGNOSIS — H93.19 TINNITUS, UNSPECIFIED LATERALITY: ICD-10-CM

## 2021-09-16 PROCEDURE — 92550 TYMPANOMETRY & REFLEX THRESH: CPT | Performed by: AUDIOLOGIST-HEARING AID FITTER

## 2021-09-16 PROCEDURE — 92557 COMPREHENSIVE HEARING TEST: CPT | Performed by: AUDIOLOGIST-HEARING AID FITTER

## 2021-10-10 ENCOUNTER — HEALTH MAINTENANCE LETTER (OUTPATIENT)
Age: 41
End: 2021-10-10

## 2021-12-03 ENCOUNTER — LAB (OUTPATIENT)
Dept: LAB | Facility: CLINIC | Age: 41
End: 2021-12-03
Payer: COMMERCIAL

## 2021-12-03 DIAGNOSIS — F64.0 GENDER DYSPHORIA IN ADOLESCENT AND ADULT: ICD-10-CM

## 2021-12-03 LAB
ANION GAP SERPL CALCULATED.3IONS-SCNC: 5 MMOL/L (ref 3–14)
BUN SERPL-MCNC: 16 MG/DL (ref 7–30)
CALCIUM SERPL-MCNC: 9.6 MG/DL (ref 8.5–10.1)
CHLORIDE BLD-SCNC: 103 MMOL/L (ref 94–109)
CHOLEST SERPL-MCNC: 192 MG/DL
CO2 SERPL-SCNC: 30 MMOL/L (ref 20–32)
CREAT SERPL-MCNC: 0.96 MG/DL (ref 0.52–1.25)
ESTRADIOL SERPL-MCNC: 180 PG/ML
FASTING STATUS PATIENT QL REPORTED: ABNORMAL
GFR SERPL CREATININE-BSD FRML MDRD: 73 ML/MIN/1.73M2
GLUCOSE BLD-MCNC: 94 MG/DL (ref 70–99)
HDLC SERPL-MCNC: 78 MG/DL
LDLC SERPL CALC-MCNC: 104 MG/DL
NONHDLC SERPL-MCNC: 114 MG/DL
POTASSIUM BLD-SCNC: 4.3 MMOL/L (ref 3.4–5.3)
SODIUM SERPL-SCNC: 138 MMOL/L (ref 133–144)
TRIGL SERPL-MCNC: 49 MG/DL

## 2021-12-03 PROCEDURE — 82465 ASSAY BLD/SERUM CHOLESTEROL: CPT

## 2021-12-03 PROCEDURE — 82670 ASSAY OF TOTAL ESTRADIOL: CPT

## 2021-12-03 PROCEDURE — 80048 BASIC METABOLIC PNL TOTAL CA: CPT

## 2021-12-05 ENCOUNTER — HEALTH MAINTENANCE LETTER (OUTPATIENT)
Age: 41
End: 2021-12-05

## 2021-12-06 ENCOUNTER — TELEPHONE (OUTPATIENT)
Dept: FAMILY MEDICINE | Facility: CLINIC | Age: 41
End: 2021-12-06
Payer: COMMERCIAL

## 2021-12-06 DIAGNOSIS — F64.0 GENDER DYSPHORIA IN ADOLESCENT AND ADULT: Primary | ICD-10-CM

## 2021-12-06 PROCEDURE — 84403 ASSAY OF TOTAL TESTOSTERONE: CPT

## 2021-12-06 NOTE — TELEPHONE ENCOUNTER
Who is calling? Patient  Reason for Call:Requesting call back from PCP regarding mychart message sent (unable to see the message )

## 2021-12-06 NOTE — CONFIDENTIAL NOTE
Called Candida  Having difficulties getting seen by [facility] in [place] due to insurance issues ([facility] thinks plan is HMO, plan is actually PPO)    Requesting testosterone level  Testosterone level was ordered by Dr. Beck  We have a tube of blood on hold for this here at [place]. We'll run the test    Ivan Rodriguez MD on 12/6/2021 at 4:09 PM

## 2021-12-09 LAB — TESTOST SERPL-MCNC: 9 NG/DL (ref 8–950)

## 2021-12-21 NOTE — RESULT ENCOUNTER NOTE
Dear Candida,     Here are your recent results which are within the expected female range. Please continue with your current plan of care and let us know if you have any questions or concerns.    Regards,  Los Beck MD

## 2021-12-23 DIAGNOSIS — F64.0 GENDER DYSPHORIA IN ADOLESCENT AND ADULT: ICD-10-CM

## 2021-12-24 RX ORDER — SPIRONOLACTONE 100 MG/1
100 TABLET, FILM COATED ORAL DAILY
Qty: 90 TABLET | Refills: 0 | Status: SHIPPED | OUTPATIENT
Start: 2021-12-24

## 2021-12-24 NOTE — TELEPHONE ENCOUNTER
Spironolactone (Aldactone) 100 mg    Last Office Visit: 4/26/21  Future [facility] Appointments: None  Medication last refilled: 9/14/21 #90 with 1 refill(s)    Vital Signs 9/4/2020 10/23/2020 4/26/2021   Systolic 114 124 107   Diastolic 83 80 73     Required labs per protocol:    DRUG REF RANGE 12/14/20 12/3/21   Creatinine 0.8-1.25 mg/dL 0.84 0.96   Potassium 3.4-5.3 mmol/L 4.4 4.3   Sodium 137.3-146.3 mmol/L 139 138     Prescription approved per [facility] Refill Protocol.    Annmarie Carvalho, RN, BSN

## 2022-07-17 ENCOUNTER — HEALTH MAINTENANCE LETTER (OUTPATIENT)
Age: 42
End: 2022-07-17

## 2022-09-18 ENCOUNTER — HEALTH MAINTENANCE LETTER (OUTPATIENT)
Age: 42
End: 2022-09-18

## 2023-07-30 ENCOUNTER — HEALTH MAINTENANCE LETTER (OUTPATIENT)
Age: 43
End: 2023-07-30

## 2024-09-22 ENCOUNTER — HEALTH MAINTENANCE LETTER (OUTPATIENT)
Age: 44
End: 2024-09-22